# Patient Record
Sex: FEMALE | Race: WHITE | Employment: OTHER | URBAN - NONMETROPOLITAN AREA
[De-identification: names, ages, dates, MRNs, and addresses within clinical notes are randomized per-mention and may not be internally consistent; named-entity substitution may affect disease eponyms.]

---

## 2017-03-07 ENCOUNTER — COMMUNICATION - GICH (OUTPATIENT)
Dept: FAMILY MEDICINE | Facility: OTHER | Age: 82
End: 2017-03-07

## 2017-05-11 ENCOUNTER — COMMUNICATION - GICH (OUTPATIENT)
Dept: FAMILY MEDICINE | Facility: OTHER | Age: 82
End: 2017-05-11

## 2017-06-14 ENCOUNTER — COMMUNICATION - GICH (OUTPATIENT)
Dept: FAMILY MEDICINE | Facility: OTHER | Age: 82
End: 2017-06-14

## 2017-06-14 DIAGNOSIS — R52 PAIN: ICD-10-CM

## 2017-07-05 ENCOUNTER — COMMUNICATION - GICH (OUTPATIENT)
Dept: FAMILY MEDICINE | Facility: OTHER | Age: 82
End: 2017-07-05

## 2017-07-24 ENCOUNTER — HISTORY (OUTPATIENT)
Dept: EMERGENCY MEDICINE | Facility: OTHER | Age: 82
End: 2017-07-24

## 2017-08-22 ENCOUNTER — AMBULATORY - GICH (OUTPATIENT)
Dept: FAMILY MEDICINE | Facility: OTHER | Age: 82
End: 2017-08-22

## 2017-08-22 ENCOUNTER — COMMUNICATION - GICH (OUTPATIENT)
Dept: FAMILY MEDICINE | Facility: OTHER | Age: 82
End: 2017-08-22

## 2017-08-22 DIAGNOSIS — L22 DIAPER DERMATITIS: ICD-10-CM

## 2017-09-01 ENCOUNTER — COMMUNICATION - GICH (OUTPATIENT)
Dept: FAMILY MEDICINE | Facility: OTHER | Age: 82
End: 2017-09-01

## 2017-09-01 DIAGNOSIS — R52 PAIN: ICD-10-CM

## 2017-09-05 ENCOUNTER — AMBULATORY - GICH (OUTPATIENT)
Dept: FAMILY MEDICINE | Facility: OTHER | Age: 82
End: 2017-09-05

## 2017-09-05 DIAGNOSIS — F41.9 ANXIETY DISORDER: ICD-10-CM

## 2017-09-07 ENCOUNTER — COMMUNICATION - GICH (OUTPATIENT)
Dept: FAMILY MEDICINE | Facility: OTHER | Age: 82
End: 2017-09-07

## 2017-09-07 DIAGNOSIS — T14.8XXA OTHER INJURY OF UNSPECIFIED BODY REGION: ICD-10-CM

## 2017-09-08 ENCOUNTER — COMMUNICATION - GICH (OUTPATIENT)
Dept: FAMILY MEDICINE | Facility: OTHER | Age: 82
End: 2017-09-08

## 2017-09-08 DIAGNOSIS — T14.8XXA OTHER INJURY OF UNSPECIFIED BODY REGION: ICD-10-CM

## 2017-09-25 ENCOUNTER — COMMUNICATION - GICH (OUTPATIENT)
Dept: FAMILY MEDICINE | Facility: OTHER | Age: 82
End: 2017-09-25

## 2017-09-25 DIAGNOSIS — F41.9 ANXIETY DISORDER: ICD-10-CM

## 2017-11-27 ENCOUNTER — AMBULATORY - GICH (OUTPATIENT)
Dept: SCHEDULING | Facility: OTHER | Age: 82
End: 2017-11-27

## 2017-12-18 ENCOUNTER — COMMUNICATION - GICH (OUTPATIENT)
Dept: FAMILY MEDICINE | Facility: OTHER | Age: 82
End: 2017-12-18

## 2017-12-28 NOTE — TELEPHONE ENCOUNTER
Patient Information     Patient Name MRN Katt Pittman [id number] Female 1934      Telephone Encounter by Enriqueta Mustafa at 2017 10:32 AM     Author:  Enriqueta Mustafa Service:  (none) Author Type:  (none)     Filed:  2017 10:37 AM Encounter Date:  2017 Status:  Signed     :  Enriqueta Mustafa [facility] is looking for alternative prescription for neomycin as they do not carry it.  This was prescribed for abrasion left shoulder, 2 1/2 by 3 cm, warm to touch.  Enriqueta Mustafa LPN 2017 10:36 AM

## 2017-12-28 NOTE — TELEPHONE ENCOUNTER
Patient Information     Patient Name MRN Katt Pittman [id number] Female 1934      Telephone Encounter by Zack Mohan MD at 2017 10:42 AM     Author:  Zack Mohan MD Service:  (none) Author Type:  Physician     Filed:  2017 10:42 AM Encounter Date:  2017 Status:  Signed     :  Zack Mohan MD (Physician)            Sent in mupirocin instead

## 2017-12-28 NOTE — TELEPHONE ENCOUNTER
Patient Information     Patient Name MRN Katt Pittman [id number] Female 1934      Telephone Encounter by Jesus Martin RN at 2017 11:23 AM     Author:  Jesus Martin RN Service:  (none) Author Type:  NURS- Registered Nurse     Filed:  2017 11:32 AM Encounter Date:  2017 Status:  Signed     :  Jesus Martin RN (NURS- Registered Nurse)            Writer received faxed clarification request from [facility] with regards to patient's tylenol. Per rx request:    Reynaldo Gamboa states this should be 2T (650 mg) TID and a PRN dose. Please advise! Thanks [facility].    Chart review shows that a new rx for Tylenol was just entered in to patient's chart on 17 as per [facility] instructions-See 17 refill encounter.    Call placed to [facility] to discuss. Spoke to Mile, pharmacist. Mile reports that requested order on 17 faxed rx clarification request is discontinued in patient's chart on their end. They are using new order as of 17. Advised this writer to disregard rx request as noted above. Writer will do as requested. Nothing further needed from this writer at this time.    Unable to complete prescription refill per RN Medication Refill Policy.................... Jesus Martin RN ....................  2017   11:31 AM

## 2017-12-28 NOTE — TELEPHONE ENCOUNTER
Patient Information     Patient Name MRN Katt Pittman [id number] Female 1934      Telephone Encounter by Colin Garcia MD at 2017  2:09 PM     Author:  Colin Garcia MD  Service:  (none) Author Type:  Physician     Filed:  2017  2:10 PM  Encounter Date:  2017 Status:  Addendum     :  Colin Garcia MD (Physician)        Related Notes: Original Note by Colin Garcia MD (Physician) filed at 2017  2:10 PM            Popeye it up with diagnosis.  I have not seen her since 2016 so need to find out why she is using this.

## 2017-12-28 NOTE — TELEPHONE ENCOUNTER
Patient Information     Patient Name MRN Katt Pittman [id number] Female 1934      Telephone Encounter by Britney Vu at 2017 10:47 AM     Author:  Britney Vu Service:  (none) Author Type:  (none)     Filed:  2017 10:49 AM Encounter Date:  2017 Status:  Signed     :  Britney Vu            Staff reports that patient 's left shoulder has what looks like an abrasion 2 1/2 by 3 cm . They also say it is warm to the touch . She doesn't have a temp. They are requesting a antibiotic ointment to put on this . Please advise if this can be done .  Britney Vu LPN ....................2017  10:48 AM

## 2017-12-28 NOTE — TELEPHONE ENCOUNTER
Patient Information     Patient Name MRN Katt Pittman [id number] Female 1934      Telephone Encounter by Colin Garcia MD at 2017  8:13 AM     Author:  Colin Garcia MD Service:  (none) Author Type:  Physician     Filed:  2017  8:13 AM Encounter Date:  2017 Status:  Signed     :  Colin Garcia MD (Physician)            A&D sent in/

## 2017-12-28 NOTE — TELEPHONE ENCOUNTER
Patient Information     Patient Name MRN Katt Pittman [id number] Female 1934      Telephone Encounter by Nadia Cote at 2017  4:53 PM     Author:  Nadia Cote Service:  (none) Author Type:  (none)     Filed:  2017  5:03 PM Encounter Date:  2017 Status:  Signed     :  Nadia Cote            The home is using the A&D for this patient due to being in the memory care unit, and urine   Nadia Cote ....................  2017   4:56 PM

## 2017-12-28 NOTE — TELEPHONE ENCOUNTER
Patient Information     Patient Name MRN Sex Katt Albert [id number] Female 1934      Telephone Encounter by Danielito Norris LPN at 2017 10:52 AM     Author:  Danielito Norris LPN Service:  (none) Author Type:  NURS- Licensed Practical Nurse     Filed:  2017 10:55 AM Encounter Date:  2017 Status:  Signed     :  Danielito Norris LPN (NURS- Licensed Practical Nurse)            Contacted Abbie at [facility] and she needs an Rx for A&D and a written script for patient so  they can apply it to her as needed. Please send Rx to [facility]. Please place order.  Danielito Norris LPN ..............2017 10:54 AM

## 2017-12-28 NOTE — TELEPHONE ENCOUNTER
Patient Information     Patient Name MRN Katt Pittman [id number] Female 1934      Telephone Encounter by Jesus Martin RN at 2017  3:55 PM     Author:  Jesus Martin RN Service:  (none) Author Type:  NURS- Registered Nurse     Filed:  2017  4:02 PM Encounter Date:  2017 Status:  Signed     :  Jesus Martin RN (NURS- Registered Nurse)            This is a Refill request from: [facility]  Name of Medication: Tylenol  Quantity requested: 100 tabs with 3 refills  Last fill date: Never, as per chart review  Due for refill: Yes, as per rx request  Last visit with LEONID MOHAN was on: No past appointments listed with this provider  PCP:  No primary care provider on file.  Controlled Substance Agreement:  N/A   Diagnosis r/t this medication request: Pain, fever as per rx request    Writer received fax from [facility] pharmacy. Fax was originally from [facility], and was forwarded to [facility]. Is dated 17 from [facility]. Orders noted on fax as follows:    Tylenol 325 mg-650 mg PO every 6 hours PRN fever, pain. 24 hour daily max 4,000 mg tylenol.    Order is signed by Dr. Mohan. No active orders for tylenol in patient's chart at this time. Writer will fanny up rx as noted above and per fax. Will route to Dr. Mohan for his consideration/approval as writer is unable to fill rx as requested.     Unable to complete prescription refill per RN Medication Refill Policy.................... Jesus Martin RN ....................  2017   3:55 PM

## 2017-12-28 NOTE — TELEPHONE ENCOUNTER
Patient Information     Patient Name MRN Katt Pittman [id number] Female 1934      Telephone Encounter by Ghada Chakraborty MD at 2017 12:05 PM     Author:  Ghada Chakraborty MD Service:  (none) Author Type:  Physician     Filed:  2017 12:06 PM Encounter Date:  2017 Status:  Signed     :  Ghada Chakraborty MD (Physician)            Ok to treat with triple antibiotic oitment twice daily until wound healed.  If worsening, she should be seen.  Ghada Chakraborty MD ....................  2017   12:05 PM

## 2017-12-28 NOTE — TELEPHONE ENCOUNTER
Patient Information     Patient Name MRN Katt Pittman [id number] Female 1934      Telephone Encounter by Jesus Martin RN at 2017  8:18 AM     Author:  Jesus Martin RN Service:  (none) Author Type:  NURS- Registered Nurse     Filed:  2017  8:29 AM Encounter Date:  2017 Status:  Signed     :  Jesus Martin RN (NURS- Registered Nurse)            This is a Refill request from: [id number]  Name of Medication: Mapap  Quantity requested: 540 tabs with refills  Last fill date: 17 as per rx request  Due for refill: Yes, as per rx request  Last visit with LEONID MOHAN was on: No past appointments listed with this provider  PCP:  No primary care provider on file.  Controlled Substance Agreement: N/A   Diagnosis r/t this medication request: Unknown    Writer received faxed rx request from [facility] pharmacy with regards to patient's Mapap. Per rx request:    Reynaldo Gamboa would like directions to be 2 tabs 3 times daily and 1-2 tabs every 6 hours as needed (max of 1 or 2 PRN doses per day).     Current order in patient's chart for tylenol is noted to be historical, and is for tylenol 500 mg. Chart review shows that writer had gotten a clarification request about Mapap on 17 as well...     Writer will fanny up rx request at this time. Will route rx request to Dr. Mohan for his consideration/approval at this time.     Unable to complete prescription refill per RN Medication Refill Policy.................... Jesus Martin RN ....................  2017   8:18 AM

## 2017-12-28 NOTE — TELEPHONE ENCOUNTER
Patient Information     Patient Name MRN Katt Pittman [id number] Female 1934      Telephone Encounter by Enriqueta Mustafa at 2017 10:46 AM     Author:  Enriqueta Mustafa Service:  (none) Author Type:  (none)     Filed:  2017 10:46 AM Encounter Date:  2017 Status:  Signed     :  Enriqueta Mustafa [facility] notified.  Enriqueta Mustafa LPN 2017 10:46 AM

## 2017-12-28 NOTE — TELEPHONE ENCOUNTER
Patient Information     Patient Name MRN Katt Pittman [id number] Female 1934      Telephone Encounter by Ghada Chakraborty MD at 2017  1:30 PM     Author:  Ghada Chakraborty MD Service:  (none) Author Type:  Physician     Filed:  2017  1:31 PM Encounter Date:  2017 Status:  Signed     :  Ghada Chakraborty MD (Physician)            This is over the counter, but prescription signed.  Ghada Chakraborty MD ....................  2017   1:31 PM

## 2017-12-28 NOTE — TELEPHONE ENCOUNTER
Patient Information     Patient Name MRN Katt Pittman [id number] Female 1934      Telephone Encounter by Britney Vu at 2017  1:25 PM     Author:  Britney Vu Service:  (none) Author Type:  (none)     Filed:  2017  1:25 PM Encounter Date:  2017 Status:  Signed     :  Britney Vu            Please fill prescription .  Britney Vu LPN ....................2017  1:25 PM

## 2018-01-01 ENCOUNTER — RESULTS ONLY (OUTPATIENT)
Dept: LAB | Age: 83
End: 2018-01-01

## 2018-01-01 ENCOUNTER — TRANSFERRED RECORDS (OUTPATIENT)
Dept: HEALTH INFORMATION MANAGEMENT | Facility: OTHER | Age: 83
End: 2018-01-01

## 2018-01-01 ENCOUNTER — NURSING HOME VISIT (OUTPATIENT)
Dept: GERIATRICS | Facility: OTHER | Age: 83
End: 2018-01-01
Attending: NURSE PRACTITIONER
Payer: COMMERCIAL

## 2018-01-01 ENCOUNTER — HOSPITAL ENCOUNTER (EMERGENCY)
Facility: OTHER | Age: 83
Discharge: NURSING, REHAB OR LONG-TERM CARE | End: 2018-12-26
Attending: PHYSICIAN ASSISTANT | Admitting: PHYSICIAN ASSISTANT
Payer: COMMERCIAL

## 2018-01-01 ENCOUNTER — APPOINTMENT (OUTPATIENT)
Dept: GENERAL RADIOLOGY | Facility: OTHER | Age: 83
End: 2018-01-01
Attending: PHYSICIAN ASSISTANT
Payer: COMMERCIAL

## 2018-01-01 ENCOUNTER — MEDICAL CORRESPONDENCE (OUTPATIENT)
Dept: HEALTH INFORMATION MANAGEMENT | Facility: OTHER | Age: 83
End: 2018-01-01

## 2018-01-01 VITALS
SYSTOLIC BLOOD PRESSURE: 117 MMHG | TEMPERATURE: 97 F | RESPIRATION RATE: 16 BRPM | OXYGEN SATURATION: 98 % | HEART RATE: 70 BPM | DIASTOLIC BLOOD PRESSURE: 81 MMHG

## 2018-01-01 VITALS
SYSTOLIC BLOOD PRESSURE: 97 MMHG | HEART RATE: 69 BPM | DIASTOLIC BLOOD PRESSURE: 69 MMHG | OXYGEN SATURATION: 98 % | HEIGHT: 65 IN | TEMPERATURE: 98 F | BODY MASS INDEX: 20.56 KG/M2 | RESPIRATION RATE: 18 BRPM | WEIGHT: 123.4 LBS

## 2018-01-01 DIAGNOSIS — F02.81 ALZHEIMER'S DEMENTIA WITH BEHAVIORAL DISTURBANCE, UNSPECIFIED TIMING OF DEMENTIA ONSET: Primary | ICD-10-CM

## 2018-01-01 DIAGNOSIS — G30.9 ALZHEIMER'S DEMENTIA WITH BEHAVIORAL DISTURBANCE, UNSPECIFIED TIMING OF DEMENTIA ONSET: Primary | ICD-10-CM

## 2018-01-01 DIAGNOSIS — E03.9 HYPOTHYROIDISM, UNSPECIFIED TYPE: ICD-10-CM

## 2018-01-01 DIAGNOSIS — T17.308A CHOKING: ICD-10-CM

## 2018-01-01 DIAGNOSIS — K08.9 POOR DENTITION: ICD-10-CM

## 2018-01-01 DIAGNOSIS — E03.9 HYPOTHYROIDISM, UNSPECIFIED TYPE: Primary | ICD-10-CM

## 2018-01-01 LAB
ALBUMIN SERPL-MCNC: 3.8 G/DL (ref 3.5–5.7)
ALP SERPL-CCNC: 90 U/L (ref 34–104)
ALT SERPL W P-5'-P-CCNC: 9 U/L (ref 7–52)
ANION GAP SERPL CALCULATED.3IONS-SCNC: 8 MMOL/L (ref 3–14)
AST SERPL W P-5'-P-CCNC: 8 U/L (ref 13–39)
BASOPHILS # BLD AUTO: 0.1 10E9/L (ref 0–0.2)
BASOPHILS NFR BLD AUTO: 0.7 %
BILIRUB SERPL-MCNC: 0.5 MG/DL (ref 0.3–1)
BUN SERPL-MCNC: 24 MG/DL (ref 7–25)
CALCIUM SERPL-MCNC: 8.9 MG/DL (ref 8.6–10.3)
CHLORIDE SERPL-SCNC: 110 MMOL/L (ref 98–107)
CO2 SERPL-SCNC: 24 MMOL/L (ref 21–31)
CREAT SERPL-MCNC: 0.96 MG/DL (ref 0.6–1.2)
DIFFERENTIAL METHOD BLD: ABNORMAL
EOSINOPHIL # BLD AUTO: 0.3 10E9/L (ref 0–0.7)
EOSINOPHIL NFR BLD AUTO: 3.4 %
ERYTHROCYTE [DISTWIDTH] IN BLOOD BY AUTOMATED COUNT: 13.2 % (ref 10–15)
GFR SERPL CREATININE-BSD FRML MDRD: 75 ML/MIN/1.7M2
GLUCOSE SERPL-MCNC: 96 MG/DL (ref 70–105)
HCT VFR BLD AUTO: 33.6 % (ref 35–47)
HGB BLD-MCNC: 10.9 G/DL (ref 11.7–15.7)
IMM GRANULOCYTES # BLD: 0 10E9/L (ref 0–0.4)
IMM GRANULOCYTES NFR BLD: 0.4 %
LYMPHOCYTES # BLD AUTO: 1.8 10E9/L (ref 0.8–5.3)
LYMPHOCYTES NFR BLD AUTO: 24.2 %
MCH RBC QN AUTO: 31.6 PG (ref 26.5–33)
MCHC RBC AUTO-ENTMCNC: 32.4 G/DL (ref 31.5–36.5)
MCV RBC AUTO: 97 FL (ref 78–100)
MONOCYTES # BLD AUTO: 0.5 10E9/L (ref 0–1.3)
MONOCYTES NFR BLD AUTO: 7 %
NEUTROPHILS # BLD AUTO: 4.8 10E9/L (ref 1.6–8.3)
NEUTROPHILS NFR BLD AUTO: 64.3 %
PLATELET # BLD AUTO: 277 10E9/L (ref 150–450)
POTASSIUM SERPL-SCNC: 3.9 MMOL/L (ref 3.5–5.1)
PROT SERPL-MCNC: 6.7 G/DL (ref 6.4–8.9)
RBC # BLD AUTO: 3.45 10E12/L (ref 3.8–5.2)
SODIUM SERPL-SCNC: 142 MMOL/L (ref 134–144)
TSH SERPL DL<=0.05 MIU/L-ACNC: 0.54 IU/ML (ref 0.34–5.6)
WBC # BLD AUTO: 7.4 10E9/L (ref 4–11)

## 2018-01-01 PROCEDURE — 99283 EMERGENCY DEPT VISIT LOW MDM: CPT | Mod: 25 | Performed by: PHYSICIAN ASSISTANT

## 2018-01-01 PROCEDURE — 99283 EMERGENCY DEPT VISIT LOW MDM: CPT | Mod: Z6 | Performed by: PHYSICIAN ASSISTANT

## 2018-01-01 PROCEDURE — 99283 EMERGENCY DEPT VISIT LOW MDM: CPT | Performed by: PHYSICIAN ASSISTANT

## 2018-01-01 PROCEDURE — 84443 ASSAY THYROID STIM HORMONE: CPT

## 2018-01-01 PROCEDURE — 71045 X-RAY EXAM CHEST 1 VIEW: CPT

## 2018-01-01 RX ORDER — LEVOTHYROXINE SODIUM 25 UG/1
25 TABLET ORAL EVERY OTHER DAY
Qty: 30 TABLET | Refills: 5 | Status: SHIPPED | OUTPATIENT
Start: 2018-01-01 | End: 2019-01-01

## 2018-01-01 RX ORDER — LEVOTHYROXINE SODIUM 50 UG/1
50 TABLET ORAL EVERY OTHER DAY
Qty: 30 TABLET | Refills: 5 | Status: SHIPPED | OUTPATIENT
Start: 2018-01-01 | End: 2019-01-01

## 2018-01-01 ASSESSMENT — MIFFLIN-ST. JEOR: SCORE: 1010.62

## 2018-01-03 NOTE — TELEPHONE ENCOUNTER
Patient Information     Patient Name MRN Katt Pittman [id number] Female 1934      Telephone Encounter by Tiffanie Vasquez at 3/7/2017  4:46 PM     Author:  Tiffanie Vasquez Service:  (none) Author Type:  (none)     Filed:  3/7/2017  4:48 PM Encounter Date:  3/7/2017 Status:  Signed     :  Tiffanie Vasquez            Received request for PA for xarelto. Spoke with MD. States ok to stop xarelto at this time. [facility] notified but can't take verbal order. Need MD ok to stop.  Tiffanie Vasquez LPN   3/7/2017  4:48 PM

## 2018-01-03 NOTE — TELEPHONE ENCOUNTER
Patient Information     Patient Name MRKatt Ramirez [id number] Female 1934      Telephone Encounter by Tiffanie Vasquez at 3/8/2017  9:12 AM     Author:  Tiffanie Vasquez Service:  (none) Author Type:  (none)     Filed:  3/8/2017  9:12 AM Encounter Date:  3/7/2017 Status:  Signed     :  Tiffanie Vasquez            This note faxed to [facility] jaxson Vasquez LPN   3/8/2017  9:12 AM

## 2018-01-05 NOTE — TELEPHONE ENCOUNTER
Patient Information     Patient Name MRN Katt Pittman [id number] Female 1934      Telephone Encounter by Jesus Martin RN at 2017  1:33 PM     Author:  Jesus Martin RN Service:  (none) Author Type:  NURS- Registered Nurse     Filed:  2017  1:45 PM Encounter Date:  2017 Status:  Signed     :  Jesus Martin RN (NURS- Registered Nurse)            [facility] MAPAP 325 mg tab. Medication not listed on patient's med list at this time as either active, historical, or ever prescribed. Chart review shows that Dr. Garcia is not patient's PCP, and that PCP is Dr. Clements (see 10/31/16 office visit notes).    Call placed to [facility] pharmacy. Spoke to Niurka pharmacy tech. Advised her that refill should be directed to patient's PCP. franklyn Bah states that she will direct refill to PCP as requested. Writer will disregard refill request at this time.    Unable to complete prescription refill per RN Medication Refill Policy.................... Jesus Martin RN ....................  2017   1:43 PM

## 2018-01-19 ENCOUNTER — COMMUNICATION - GICH (OUTPATIENT)
Dept: FAMILY MEDICINE | Facility: OTHER | Age: 83
End: 2018-01-19

## 2018-01-19 DIAGNOSIS — F03.90 DEMENTIA WITHOUT BEHAVIORAL DISTURBANCE (H): ICD-10-CM

## 2018-02-12 NOTE — TELEPHONE ENCOUNTER
Patient Information     Patient Name MRN Katt Pittman [id number] Female 1934      Telephone Encounter by Jesus Martin RN at 2017 10:32 AM     Author:  Jesus Martin RN Service:  (none) Author Type:  NURS- Registered Nurse     Filed:  2017 10:48 AM Encounter Date:  2017 Status:  Signed     :  Jesus Martin RN (NURS- Registered Nurse)            Writer received a faxed rx request from [id number] for nystatin powder for patient. Chart review shows that Dr. Garcia has not seen patient since 10/31/16, and at that time patient was a patient's of Dr. Clements's. Chart review shows that patient is now residing at [facility]. Rx as requested her never been ordered for patient in [facility]. Call placed to [facility] to see where rx is to be applied. Spoke to CHAKA Schilling. CHAKA Schilling reports that they actually would like rx to be discontinued in patient's chart. Patient is no longer using rx, and they do not need refills. Dr. Garcia is out of the office until 17, but [facility] would need a faxed order to d/c. Since rx is PRN, CHAKA Schilling reports that faxed order can be sent over on 17 by Dr. Garcia. Writer will refuse rx request from pharmacy at this time as rx is no longer needed per LPN report. Writer also alerted LPN that patient should see a provider in the clinic to establish care as patient with no PCP at this time. LPN states understanding and will notify family.    Writer will refuse rx request at this time. Will route encounter to Dr. Garcia for him to write a D/c order for nystatin powder upon his return on 17.    Unable to complete prescription refill per RN Medication Refill Policy.................... Jesus Martin RN ....................  2017   10:45 AM

## 2018-02-12 NOTE — TELEPHONE ENCOUNTER
Patient Information     Patient Name MRN Katt Pittman [id number] Female 1934      Telephone Encounter by Jesus Martin RN at 2017  2:43 PM     Author:  Jseus Martin RN Service:  (none) Author Type:  NURS- Registered Nurse     Filed:  2017  2:44 PM Encounter Date:  2017 Status:  Signed     :  Jesus Martin RN (NURS- Registered Nurse)            Writer contacted [facility]. Spoke to CHAKA Schilling. Gave CHAKA Schilling verbal order to stop nystatin as per Dr. Garcia. CHAKA Schilling will process verbal order per Dr. Garcia and this writer. Nothing further needed at this time.    Jesus Martin RN ....................  2017   2:44 PM

## 2018-02-12 NOTE — TELEPHONE ENCOUNTER
Patient Information     Patient Name MRN Katt Pittman [id number] Female 1934      Telephone Encounter by Colin Garcia MD at 2017  2:33 PM     Author:  Colin Garcia MD Service:  (none) Author Type:  Physician     Filed:  2017  2:33 PM Encounter Date:  2017 Status:  Signed     :  Colin Garcia MD (Physician)            Ok to stop nystatin powder.

## 2018-02-22 ENCOUNTER — DOCUMENTATION ONLY (OUTPATIENT)
Dept: FAMILY MEDICINE | Facility: OTHER | Age: 83
End: 2018-02-22

## 2018-02-22 PROBLEM — E03.9 HYPOTHYROIDISM: Status: ACTIVE | Noted: 2018-02-22

## 2018-02-22 PROBLEM — K21.9 ESOPHAGEAL REFLUX: Status: ACTIVE | Noted: 2018-02-22

## 2018-02-22 PROBLEM — J30.9 ALLERGIC RHINITIS: Status: ACTIVE | Noted: 2018-02-22

## 2018-02-22 PROBLEM — K44.9 HIATAL HERNIA: Status: ACTIVE | Noted: 2018-02-22

## 2018-02-22 RX ORDER — LEVOTHYROXINE SODIUM 50 UG/1
50 TABLET ORAL
COMMUNITY
Start: 2015-02-01 | End: 2018-01-01

## 2018-02-22 RX ORDER — BUSPIRONE HYDROCHLORIDE 10 MG/1
10 TABLET ORAL 2 TIMES DAILY
COMMUNITY
Start: 2017-09-25 | End: 2019-01-01

## 2018-02-22 RX ORDER — LORAZEPAM 0.5 MG/1
0.5 TABLET ORAL EVERY 6 HOURS PRN
COMMUNITY
End: 2018-08-21

## 2018-02-22 RX ORDER — SENNOSIDES A AND B 8.6 MG/1
2 TABLET, FILM COATED ORAL EVERY OTHER DAY
COMMUNITY
Start: 2016-07-21 | End: 2018-04-13

## 2018-02-22 RX ORDER — ACETAMINOPHEN 325 MG/1
TABLET ORAL
COMMUNITY
Start: 2017-09-01 | End: 2018-08-23

## 2018-02-22 RX ORDER — ACETAMINOPHEN 500 MG
1000 TABLET ORAL EVERY 6 HOURS PRN
COMMUNITY
Start: 2017-07-24 | End: 2018-08-28 | Stop reason: ALTCHOICE

## 2018-04-13 DIAGNOSIS — K59.00 CONSTIPATION, UNSPECIFIED CONSTIPATION TYPE: Primary | ICD-10-CM

## 2018-04-13 RX ORDER — SENNOSIDES A AND B 8.6 MG/1
1 TABLET, FILM COATED ORAL 2 TIMES DAILY
Qty: 180 TABLET | Refills: 3 | Status: SHIPPED | OUTPATIENT
Start: 2018-04-13 | End: 2019-01-01 | Stop reason: DRUGHIGH

## 2018-04-13 NOTE — TELEPHONE ENCOUNTER
Rx request received from [id number] in relation to patient's senna. Per faxed rx request, patient is taking senna 8.6 mg one tab BID. Current rx on file in patient's chart, as well as per Care Everywhere as follows:    SENNA LAX 8.6 mg tablet   TAKE 2 TABS (17.2MG) BY MOUTH EVERY OTHER DAY     Last office visit with PCP was on 10/31/16. Writer will fanny up rx as requested by pharmacy and will route rx request to PCP for his consideration/approval at this time. No dx to associate in patient's chart as well.    Unable to complete prescription refill per RN Medication Refill Policy. Jesus Martin 4/13/2018 9:20 AM

## 2018-04-27 DIAGNOSIS — K59.00 CONSTIPATION, UNSPECIFIED CONSTIPATION TYPE: ICD-10-CM

## 2018-04-27 RX ORDER — SENNOSIDES A AND B 8.6 MG/1
1 TABLET, FILM COATED ORAL 2 TIMES DAILY
Qty: 180 TABLET | Refills: 3 | OUTPATIENT
Start: 2018-04-27

## 2018-04-27 NOTE — TELEPHONE ENCOUNTER
Redundant Refill Request for Senna refused;    Medication Detail      Disp Refills Start End DELLA   senna (RA SENNA) 8.6 MG tablet 180 tablet 3 4/13/2018  No   Sig: Take 1 tablet by mouth 2 times daily   Class: E-Prescribe   Route: Oral   Order: [id number]   E-Prescribing Status: Receipt confirmed by pharmacy (4/13/2018  9:37 AM CDT)   Printout Tracking   External Result Report   Pharmacy   [id number] - [place], [place] - [address]     Unable to complete prescription refill per RN Medication Refill Policy. Jesus Martin 4/27/2018 9:09 AM

## 2018-05-07 NOTE — TELEPHONE ENCOUNTER
Writer received an additional faxed rx request from [id number] in relation to patient's senna. Call placed to pharmacy. Spoke to Stella, pharmacy franklyn. Advised her of rx as sent in with 4/13/18 refill encounter. Asked tech if they had received that rx. Pharmacy tech states that they had indeed received it, and that they will refill rx as requested from that rx at this time. Writer will disregard rx request at this time as no refill is needed at this time.    Unable to complete prescription refill per RN Medication Refill Policy. Jesus Martin 5/7/2018 10:49 AM

## 2018-05-21 DIAGNOSIS — K59.00 CONSTIPATION, UNSPECIFIED CONSTIPATION TYPE: ICD-10-CM

## 2018-05-21 RX ORDER — SENNOSIDES A AND B 8.6 MG/1
1 TABLET, FILM COATED ORAL 2 TIMES DAILY
Qty: 180 TABLET | Refills: 3 | OUTPATIENT
Start: 2018-05-21

## 2018-05-21 NOTE — TELEPHONE ENCOUNTER
Writer again received [id number] relation to patient's senna. Per chart review, this Rx request was cared for with 4/13/18 refill request, as well as 4/27/18 refill request. Rx has been filled as noted below for an annual supply:    Medication Detail      Disp Refills Start End DELLA   senna (RA SENNA) 8.6 MG tablet 180 tablet 3 4/13/2018  No   Sig: Take 1 tablet by mouth 2 times daily   Class: E-Prescribe   Route: Oral   Order: [id number]   E-Prescribing Status: Receipt confirmed by pharmacy (4/13/2018  9:37 AM CDT)   Printout Tracking   External Result Report   Pharmacy   [id number] - [place], [place] - [address]       Writer will refuse Rx request at this time. Will make note of Rx as noted above in Rx refusal to pharmacy.    Unable to complete prescription refill per RN Medication Refill Policy. Jesus Martin 5/21/2018 8:32 AM

## 2018-08-14 ENCOUNTER — HOSPITAL ENCOUNTER (EMERGENCY)
Facility: OTHER | Age: 83
Discharge: HOME | End: 2018-08-14
Attending: PHYSICIAN ASSISTANT | Admitting: PHYSICIAN ASSISTANT
Payer: COMMERCIAL

## 2018-08-14 VITALS
SYSTOLIC BLOOD PRESSURE: 131 MMHG | TEMPERATURE: 97.1 F | DIASTOLIC BLOOD PRESSURE: 78 MMHG | WEIGHT: 125 LBS | BODY MASS INDEX: 21.46 KG/M2 | RESPIRATION RATE: 16 BRPM | OXYGEN SATURATION: 98 % | HEART RATE: 68 BPM
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
## 2018-08-14 DIAGNOSIS — S61.411A LACERATION OF RIGHT HAND WITHOUT FOREIGN BODY, INITIAL ENCOUNTER: ICD-10-CM

## 2018-08-14 DIAGNOSIS — S00.83XA CONTUSION, CHEEK, INITIAL ENCOUNTER: ICD-10-CM

## 2018-08-14 PROCEDURE — 27210282 ZZH ADHESIVE DERMABOND SKIN: Performed by: PHYSICIAN ASSISTANT

## 2018-08-14 PROCEDURE — 99283 EMERGENCY DEPT VISIT LOW MDM: CPT | Mod: Z6 | Performed by: PHYSICIAN ASSISTANT

## 2018-08-14 PROCEDURE — 99283 EMERGENCY DEPT VISIT LOW MDM: CPT | Mod: 25 | Performed by: PHYSICIAN ASSISTANT

## 2018-08-14 PROCEDURE — 12001 RPR S/N/AX/GEN/TRNK 2.5CM/<: CPT | Mod: Z6 | Performed by: PHYSICIAN ASSISTANT

## 2018-08-14 PROCEDURE — 12001 RPR S/N/AX/GEN/TRNK 2.5CM/<: CPT | Performed by: PHYSICIAN ASSISTANT

## 2018-08-14 ASSESSMENT — ENCOUNTER SYMPTOMS
ABDOMINAL PAIN: 0
DIFFICULTY URINATING: 0
COLOR CHANGE: 0
NECK STIFFNESS: 0
ARTHRALGIAS: 0
EYE REDNESS: 0
HEADACHES: 0
CONFUSION: 0
FEVER: 0
SHORTNESS OF BREATH: 0

## 2018-08-14 NOTE — ED AVS SNAPSHOT
[facility]    [address]    [place] [address]    Phone:  [phone]    Fax:  [phone]                                       Katt Aldrich   MRN: [id number]    Department:  [facility]   Date of Visit:  8/14/2018           Patient Information     Date Of Birth          8/13/1934        Your diagnoses for this visit were:     Fall, initial encounter     Contusion, cheek, initial encounter     Laceration of right hand without foreign body, initial encounter        You were seen by Mahesh Moser PA-C.      24 Hour Appointment Hotline     To schedule an appointment at [facility], please call [phone]. If you don't have a family doctor or clinic, we will help you find one. [facility] clinics are conveniently located to serve the needs of you and your family.           Review of your medicines      Our records show that you are taking the medicines listed below. If these are incorrect, please call your family doctor or clinic.        Dose / Directions Last dose taken    * acetaminophen 500 MG tablet   Commonly known as:  TYLENOL   Dose:  1000 mg        Take 1,000 mg by mouth every 6 hours as needed Max acetaminophen dose: 4000mg in 24 hrs.   Refills:  0        * acetaminophen 325 MG tablet   Commonly known as:  TYLENOL        Take 2 tabs PO TID and 1-2 tabs PO every 6 hours PRN (Max of 1 or 2 PRN doses per day) Max acetaminophen dose: 4000mg in 24 hrs.   Refills:  0        BENZETHONIUM CL & PETROLATUM EX   Dose:  1 Tube        Apply 1 Tube topically daily as needed   Refills:  0        busPIRone 10 MG tablet   Commonly known as:  BUSPAR   Dose:  10 mg        Take 10 mg by mouth 2 times daily   Refills:  0        Lanolin-Petrolatum 15.5-53.4 % Oint        Apply topically daily as needed   Refills:  0        levothyroxine 50 MCG tablet   Commonly known as:  SYNTHROID/LEVOTHROID   Dose:  50 mcg        Take 50 mcg by mouth every morning (before  "breakfast)   Refills:  0        LORazepam 0.5 MG tablet   Commonly known as:  ATIVAN   Dose:  0.5 mg        Take 0.5 mg by mouth every 6 hours as needed   Refills:  0        senna 8.6 MG tablet   Commonly known as:  RA SENNA   Dose:  1 tablet   Quantity:  180 tablet        Take 1 tablet by mouth 2 times daily   Refills:  3        * Notice:  This list has 2 medication(s) that are the same as other medications prescribed for you. Read the directions carefully, and ask your doctor or other care provider to review them with you.            Orders Needing Specimen Collection     None      Pending Results     No orders found from 8/12/2018 to 8/15/2018.            Pending Culture Results     No orders found from 8/12/2018 to 8/15/2018.            Pending Results Instructions     If you had any lab results that were not finalized at the time of your Discharge, you can call the ED Lab Result RN at [phone]. You will be contacted by this team for any positive Lab results or changes in treatment. The nurses are available 7 days a week from 10A to 6:30P.  You can leave a message 24 hours per day and they will return your call.        Thank you for choosing [facility]       Thank you for choosing [facility] for your care. Our goal is always to provide you with excellent care. Hearing back from our patients is one way we can continue to improve our services. Please take a few minutes to complete the written survey that you may receive in the mail after you visit with us. Thank you!        [employer] Information     [employer] lets you send messages to your doctor, view your test results, renew your prescriptions, schedule appointments and more. To sign up, go to [email or web address] . Click on \"Log in\" on the left side of the screen, which will take you to the Welcome page. Then click on \"Sign up Now\" on the right side of the page.     You will be asked to enter the access code listed below, as well as some personal information. " Please follow the directions to create your username and password.     Your access code is: [email or web address]  Expires: 2018  5:54 PM     Your access code will  in 90 days. If you need help or a new code, please call your [facility] clinic or [phone].        Care EveryWhere ID     This is your Care EveryWhere ID. This could be used by other organizations to access your [facility] medical records  [id number]        Equal Access to Services     [facility] : Hadii gaby valentine hadasho Soomaali, waaxda luqadaha, qaybta kaalmada adeegyacydney, anali cartwright . So [facility] [phone].    ATENCIÓN: Si habla español, tiene a soto disposición servicios gratuitos de asistencia lingüística. Llame al [phone].    We comply with applicable federal civil rights laws and [place] laws. We do not discriminate on the basis of race, color, national origin, age, disability, sex, sexual orientation, or gender identity.            After Visit Summary       This is your record. Keep this with you and show to your community pharmacist(s) and doctor(s) at your next visit.

## 2018-08-14 NOTE — ED TRIAGE NOTES
Presents via meds 1 ambulance having had a fall a [facility]. She hit her right hand and face. Patient has bruising around her right eye, cheek and chin. Did break a small piece of her tooth. Also has a 2 1/2 cm skin tear on her right hand.

## 2018-08-14 NOTE — ED PROVIDER NOTES
History     Chief Complaint   Patient presents with     Fall     Facial Injury     HPI Comments: This is a 84-year-old female who resides at [facility].  Has severe dementia.  Apparently tripped and hit her right hand as well as her right zygomatic area.  This was witnessed there was no loss of consciousness.  She did break a small piece of her right incisor tooth as well.  She sustained a small 2.5 cm skin tear to the dorsal aspect of her right hand over the ring and small finger knuckles.  She is constantly fidgeting with her wounds.  When examining her zygomatic area she is constantly pulling my hands away as well.  No other injuries at this time.    The history is provided by the patient and the EMS personnel.         Problem List:    Patient Active Problem List    Diagnosis Date Noted     Allergic rhinitis 02/22/2018     Priority: Medium     Myalgia and myositis 02/22/2018     Priority: Medium     Esophageal reflux 02/22/2018     Priority: Medium     Hiatal hernia 02/22/2018     Priority: Medium     Hypothyroidism 02/22/2018     Priority: Medium     BRBPR (bright red blood per rectum) 11/12/2016     Priority: Medium     Deep vein thrombosis (DVT) of right lower extremity (H) 11/12/2016     Priority: Medium     Dementia 11/12/2016     Priority: Medium     Headache 08/13/2013     Priority: Medium     Pulmonary fibrosis (H) 02/20/2012     Priority: Medium     Disorder of bone and cartilage 04/05/2006     Priority: Medium        Past Medical History:    Past Medical History:   Diagnosis Date     Diverticulosis of intestine without perforation or abscess without bleeding      Pulmonary fibrosis (H)      Pure hypercholesterolemia        Past Surgical History:    Past Surgical History:   Procedure Laterality Date     COLON SURGERY      01/2005,secondary to diverticulosis (sigmoid)     ESOPHAGOSCOPY, GASTROSCOPY, DUODENOSCOPY (EGD), COMBINED      06/19/2009     EXTRACAPSULAR CATARACT EXTRATION  WITH INTRAOCULAR LENS IMPLANT      2009,right     HEMORRHOID SURGERY      surgery x 3     OTHER SURGICAL HISTORY      2005,[id number],BLEPHAROPLASTY,Eye lid surgery bilateral     TONSILLECTOMY, ADENOIDECTOMY, COMBINED      No Comments Provided       Family History:    Family History   Problem Relation Age of Onset     Arthritis Mother      Arthritis, hx of rheumatoid arthritis     Substance Abuse Father      Alcohol/Drug, complications of alcohol     HEART DISEASE Brother      Heart Disease,Hx of CAD     Diabetes Brother      Diabetes,Diabetes mellitus       Social History:  Marital Status:   [2]  Social History   Substance Use Topics     Smoking status: Former Smoker     Packs/day: 0.50     Years: 26.00     Types: Cigarettes     Smokeless tobacco: Never Used     Alcohol use No        Medications:      acetaminophen (TYLENOL) 325 MG tablet   acetaminophen (TYLENOL) 500 MG tablet   BENZETHONIUM CL & PETROLATUM EX   busPIRone (BUSPAR) 10 MG tablet   Lanolin-Petrolatum 15.5-53.4 % OINT   levothyroxine (SYNTHROID/LEVOTHROID) 50 MCG tablet   LORazepam (ATIVAN) 0.5 MG tablet   senna (RA SENNA) 8.6 MG tablet         Review of Systems   Unable to perform ROS: Dementia   Constitutional: Negative for fever.   HENT: Negative for congestion.         Minimal right zygomatic swelling.  She does have a small incisor tooth chip   Eyes: Negative for redness.   Respiratory: Negative for shortness of breath.    Cardiovascular: Negative for chest pain.   Gastrointestinal: Negative for abdominal pain.   Genitourinary: Negative for difficulty urinating.   Musculoskeletal: Negative for arthralgias and neck stiffness.        Right hand 2.5 cm superficial skin tear.   Skin: Negative for color change.   Neurological: Negative for headaches.   Psychiatric/Behavioral: Negative for confusion.       Physical Exam   BP: 131/78  Pulse: 68  Heart Rate: 68  Temp: 97.1  F (36.2  C)  Resp: 16  Weight: 56.7 kg (125 lb)  SpO2: 98 %      Physical  Exam   Constitutional: No distress.   HENT:   Head: Atraumatic.   Right Ear: No drainage or tenderness.   Left Ear: No drainage or tenderness.   Nose: No mucosal edema or rhinorrhea. No epistaxis.   Mouth/Throat: Oropharynx is clear and moist. No oropharyngeal exudate.       Right zygomatic cheek contusion and bruising.  Bones appear intact on palpation.  His pack was applied but patient continues to remove this.  Does have a small piece of her right incisor tooth chipped off otherwise her teeth are intact.   Eyes: Pupils are equal, round, and reactive to light. No scleral icterus. Right eye exhibits normal extraocular motion and no nystagmus. Left eye exhibits normal extraocular motion and no nystagmus. Right pupil is round and reactive. Left pupil is round and reactive. Pupils are equal.   Cardiovascular: Normal heart sounds and intact distal pulses.    Pulmonary/Chest: Breath sounds normal. No respiratory distress.   Abdominal: Soft. Bowel sounds are normal. There is no tenderness.   Musculoskeletal: He exhibits no edema or tenderness.        Hands:  2.5 cm superficial laceration to her right hand over the ring and long finger knuckles.  No bleeding at this time.  Patient is constantly picking at this   Neurological: Coordination normal. GCS eye subscore is 4. GCS verbal subscore is 4. GCS motor subscore is 6.   No focal neurological findings or deficits.   Skin: Skin is warm. No rash noted. He is not diaphoretic.       ED Course     ED Course     Laceration repair  Date/Time: 8/14/2018 5:15 PM  Performed by: KRISTIN JUNE  Authorized by: KRISTIN JUNE   Consent: Verbal consent obtained. Written consent not obtained.  Consent given by: guardian  Patient understanding: patient states understanding of the procedure being performed  Patient consent: the patient's understanding of the procedure matches consent given  Procedure consent: procedure consent matches procedure scheduled  Patient identity confirmed:  arm band, hospital-assigned identification number and anonymous protocol, patient vented/unresponsive  Body area: upper extremity  Location details: right hand  Laceration length: 2.5 cm  Contamination: The wound is contaminated.  Tendon involvement: none  Nerve involvement: none  Vascular damage: no    Sedation:  Patient sedated: no  Preparation: Patient was prepped and draped in the usual sterile fashion.  Irrigation solution: saline  Skin closure: glue  Technique: simple  Approximation: close  Approximation difficulty: simple  Dressing: tube gauze  Patient tolerance: Patient tolerated the procedure well with no immediate complications            No results found for this or any previous visit (from the past 24 hour(s)).    Medications - No data to display    Assessments & Plan (with Medical Decision Making)     I have reviewed the nursing notes.    I have reviewed the findings, diagnosis, plan and need for follow up with the patient.      New Prescriptions    No medications on file       Final diagnoses:   Fall, initial encounter   Contusion, cheek, initial encounter   Laceration of right hand without foreign body, initial encounter     Afebrile.  Vital signs stable.  Severely demented elderly female with a trip and fall sustaining a right zygomatic contusion to her cheek as well as a superficial laceration to her right hand over the ring and small knuckles.  Laceration was repaired as above.  She is not currently on blood thinners.  He was observed for an extended period time in the ER with no decline noted.  She will return to the memory care unit at this time.  Follow-up for further eval evaluation as needed there are any concerns.  8/14/2018   [facility]     Mahesh Moser PA-C  08/14/18 2090

## 2018-08-14 NOTE — ED AVS SNAPSHOT
[facility]    [address]    [place] [address]    Phone:  [phone]    Fax:  [phone]                                       Katt Aldrich   MRN: [id number]    Department:  [facility]   Date of Visit:  8/14/2018           After Visit Summary Signature Page     I have received my discharge instructions, and my questions have been answered. I have discussed any challenges I see with this plan with the nurse or doctor.    ..........................................................................................................................................  Patient/Patient Representative Signature      ..........................................................................................................................................  Patient Representative Print Name and Relationship to Patient    ..................................................               ................................................  Date                                            Time    ..........................................................................................................................................  Reviewed by Signature/Title    ...................................................              ..............................................  Date                                                            Time

## 2018-08-21 DIAGNOSIS — K59.00 CONSTIPATION, UNSPECIFIED CONSTIPATION TYPE: ICD-10-CM

## 2018-08-21 DIAGNOSIS — F02.81 ALZHEIMER'S DEMENTIA WITH BEHAVIORAL DISTURBANCE, UNSPECIFIED TIMING OF DEMENTIA ONSET: Primary | ICD-10-CM

## 2018-08-21 DIAGNOSIS — G30.9 ALZHEIMER'S DEMENTIA WITH BEHAVIORAL DISTURBANCE, UNSPECIFIED TIMING OF DEMENTIA ONSET: Primary | ICD-10-CM

## 2018-08-23 DIAGNOSIS — R52 PAIN: Primary | ICD-10-CM

## 2018-08-23 RX ORDER — LORAZEPAM 0.5 MG/1
0.5 TABLET ORAL EVERY 6 HOURS PRN
Qty: 60 TABLET | Refills: 3 | Status: SHIPPED | OUTPATIENT
Start: 2018-08-23

## 2018-08-23 RX ORDER — SENNOSIDES A AND B 8.6 MG/1
1 TABLET, FILM COATED ORAL 2 TIMES DAILY PRN
COMMUNITY
Start: 2016-07-21 | End: 2019-01-01

## 2018-08-23 RX ORDER — PETROLATUM,WHITE/LANOLIN
OINTMENT (GRAM) TOPICAL
COMMUNITY
Start: 2017-08-24 | End: 2019-01-01

## 2018-08-23 RX ORDER — SENNOSIDES A AND B 8.6 MG/1
1 TABLET, FILM COATED ORAL 2 TIMES DAILY
Qty: 180 TABLET | Refills: 3 | OUTPATIENT
Start: 2018-08-23

## 2018-08-23 NOTE — TELEPHONE ENCOUNTER
PLEASE REVIEW, SIGN AND SEND AS APPROPRIATE: THANK YOU.    [id number] sent Rx request for the following:    LORazepam (ATIVAN) 0.5 MG tablet  Last Office Visit: 10/31/16  Future Office visit:   None.    Routing refill request to provider for review/approval because:    Patient last seen Oct. 2016- Called and spoke with Nicki at [facility] and she states Pt is severely demented and lives in their memory care unit, with agitations and it would very difficult to bring her in for an annual Px.    Drug not on the [facility], [facility] or [facility] refill protocol or controlled substance    Patient reported/hisotrical    Unable to complete prescription refill per RN Medication Refill Policy. Yael Henderson RN .............. 8/23/2018  2:37 PM

## 2018-08-23 NOTE — TELEPHONE ENCOUNTER
Redundant refill request refused: Too soon.    senna (RA SENNA) 8.6 MG tablet 180 tablet 3 4/13/2018  No   Sig - Route: Take 1 tablet by mouth 2 times daily - Oral   Class: E-Prescribe   Order: [id number]   E-Prescribing Status: Receipt confirmed by pharmacy (4/13/2018  9:37 AM CDT)     [id number] - [place], [place] - [address]     Unable to complete prescription refill per RN Medication Refill Policy. Yael Henderson RN .............. 8/23/2018  2:09 PM

## 2018-08-27 RX ORDER — ACETAMINOPHEN 325 MG/1
325-650 TABLET ORAL EVERY 6 HOURS PRN
Qty: 540 TABLET | Refills: 11 | Status: SHIPPED | OUTPATIENT
Start: 2018-08-27 | End: 2018-08-28 | Stop reason: ALTCHOICE

## 2018-08-27 NOTE — TELEPHONE ENCOUNTER
MAPAP 325MG TAB      Last Written Prescription Date:  09/01/2017 by Dr. Mckeon  Last Fill Quantity: 540,   # refills: 11  Last Office Visit: 10/31/16  Future Office visit:  None scheduled     Routing refill request to provider for review/approval because:  Patient has not been seen by PCP since 10/31/16. Spoke to EC,  German, and he states patient is at [facility] on a memory care unit and he is in charge of making her appointments. German states once he figures out a good day for the patient to come in he will call and schedule an appointment. Please fill medication if appropriate. Thank you.     Ada Dickinson, RN on 8/27/2018 at 11:13 AM

## 2018-08-28 DIAGNOSIS — R52 PAIN: Primary | ICD-10-CM

## 2018-08-28 RX ORDER — ACETAMINOPHEN 325 MG/1
650 TABLET ORAL 3 TIMES DAILY
Qty: 540 TABLET | Refills: 3 | Status: SHIPPED | OUTPATIENT
Start: 2018-08-28 | End: 2019-01-01

## 2018-10-03 ENCOUNTER — HOSPITAL ENCOUNTER (EMERGENCY)
Facility: OTHER | Age: 83
Discharge: NURSING, REHAB OR LONG-TERM CARE | End: 2018-10-03
Attending: FAMILY MEDICINE | Admitting: FAMILY MEDICINE
Payer: COMMERCIAL

## 2018-10-03 ENCOUNTER — APPOINTMENT (OUTPATIENT)
Dept: CT IMAGING | Facility: OTHER | Age: 83
End: 2018-10-03
Attending: FAMILY MEDICINE
Payer: COMMERCIAL

## 2018-10-03 ENCOUNTER — APPOINTMENT (OUTPATIENT)
Dept: GENERAL RADIOLOGY | Facility: OTHER | Age: 83
End: 2018-10-03
Attending: FAMILY MEDICINE
Payer: COMMERCIAL

## 2018-10-03 VITALS
TEMPERATURE: 98 F | RESPIRATION RATE: 16 BRPM | HEART RATE: 56 BPM | WEIGHT: 135 LBS | SYSTOLIC BLOOD PRESSURE: 102 MMHG | HEIGHT: 65 IN | OXYGEN SATURATION: 100 % | BODY MASS INDEX: 22.49 KG/M2 | DIASTOLIC BLOOD PRESSURE: 87 MMHG

## 2018-10-03 DIAGNOSIS — R41.0 DISORIENTATION: Primary | ICD-10-CM

## 2018-10-03 LAB
ALBUMIN UR-MCNC: NEGATIVE MG/DL
APPEARANCE UR: ABNORMAL
BACTERIA #/AREA URNS HPF: ABNORMAL /HPF
BILIRUB UR QL STRIP: NEGATIVE
COLOR UR AUTO: YELLOW
GLUCOSE UR STRIP-MCNC: NEGATIVE MG/DL
HGB UR QL STRIP: NEGATIVE
KETONES UR STRIP-MCNC: NEGATIVE MG/DL
LEUKOCYTE ESTERASE UR QL STRIP: ABNORMAL
NITRATE UR QL: POSITIVE
NON-SQ EPI CELLS #/AREA URNS LPF: ABNORMAL /LPF
PH UR STRIP: 5 PH (ref 5–9)
RBC #/AREA URNS AUTO: ABNORMAL /HPF
SOURCE: ABNORMAL
SP GR UR STRIP: >1.03 (ref 1–1.03)
UROBILINOGEN UR STRIP-ACNC: 0.2 EU/DL (ref 0.2–1)
WBC #/AREA URNS AUTO: ABNORMAL /HPF

## 2018-10-03 PROCEDURE — 70450 CT HEAD/BRAIN W/O DYE: CPT

## 2018-10-03 PROCEDURE — 99284 EMERGENCY DEPT VISIT MOD MDM: CPT | Mod: Z6 | Performed by: FAMILY MEDICINE

## 2018-10-03 PROCEDURE — 80053 COMPREHEN METABOLIC PANEL: CPT | Performed by: FAMILY MEDICINE

## 2018-10-03 PROCEDURE — 81001 URINALYSIS AUTO W/SCOPE: CPT | Performed by: FAMILY MEDICINE

## 2018-10-03 PROCEDURE — 99285 EMERGENCY DEPT VISIT HI MDM: CPT | Mod: 25 | Performed by: FAMILY MEDICINE

## 2018-10-03 PROCEDURE — 71045 X-RAY EXAM CHEST 1 VIEW: CPT

## 2018-10-03 PROCEDURE — 36415 COLL VENOUS BLD VENIPUNCTURE: CPT | Performed by: FAMILY MEDICINE

## 2018-10-03 PROCEDURE — 85025 COMPLETE CBC W/AUTO DIFF WBC: CPT | Performed by: FAMILY MEDICINE

## 2018-10-03 RX ORDER — SULFAMETHOXAZOLE/TRIMETHOPRIM 800-160 MG
1 TABLET ORAL 2 TIMES DAILY
Qty: 20 TABLET | Refills: 0 | Status: SHIPPED | OUTPATIENT
Start: 2018-10-03 | End: 2019-01-01

## 2018-10-03 NOTE — ED PROVIDER NOTES
History     Chief Complaint   Patient presents with     not acting normal     HPI  Katt Aldrich is a 84 year old male who is the emergency department with alteration of consciousness from baseline.  Happened similarly in the past with dehydration and UTI.  She is accompanied currently with her spouse who states that now she is back to her baseline.  The nursing home however was concerned.  No fevers there.  Problem List:    Patient Active Problem List    Diagnosis Date Noted     Allergic rhinitis 02/22/2018     Priority: Medium     Myalgia and myositis 02/22/2018     Priority: Medium     Esophageal reflux 02/22/2018     Priority: Medium     Hiatal hernia 02/22/2018     Priority: Medium     Hypothyroidism 02/22/2018     Priority: Medium     BRBPR (bright red blood per rectum) 11/12/2016     Priority: Medium     Deep vein thrombosis (DVT) of right lower extremity (H) 11/12/2016     Priority: Medium     Dementia 11/12/2016     Priority: Medium     Headache 08/13/2013     Priority: Medium     Pulmonary fibrosis (H) 02/20/2012     Priority: Medium     Disorder of bone and cartilage 04/05/2006     Priority: Medium        Past Medical History:    Past Medical History:   Diagnosis Date     Diverticulosis of intestine without perforation or abscess without bleeding      Pulmonary fibrosis (H)      Pure hypercholesterolemia        Past Surgical History:    Past Surgical History:   Procedure Laterality Date     COLON SURGERY      01/2005,secondary to diverticulosis (sigmoid)     ESOPHAGOSCOPY, GASTROSCOPY, DUODENOSCOPY (EGD), COMBINED      06/19/2009     EXTRACAPSULAR CATARACT EXTRATION WITH INTRAOCULAR LENS IMPLANT      2009,right     HEMORRHOID SURGERY      surgery x 3     OTHER SURGICAL HISTORY      2005,[id number],BLEPHAROPLASTY,Eye lid surgery bilateral     TONSILLECTOMY, ADENOIDECTOMY, COMBINED      No Comments Provided       Family History:    Family History   Problem Relation Age of Onset     Arthritis Mother       "Arthritis, hx of rheumatoid arthritis     Substance Abuse Father      Alcohol/Drug, complications of alcohol     HEART DISEASE Brother      Heart Disease,Hx of CAD     Diabetes Brother      Diabetes,Diabetes mellitus       Social History:  Marital Status:   [5]  Social History   Substance Use Topics     Smoking status: Former Smoker     Packs/day: 0.50     Years: 26.00     Types: Cigarettes     Smokeless tobacco: Never Used     Alcohol use No        Medications:      busPIRone (BUSPAR) 10 MG tablet   levothyroxine (SYNTHROID/LEVOTHROID) 50 MCG tablet   sulfamethoxazole-trimethoprim (BACTRIM DS) 800-160 MG per tablet   acetaminophen (TYLENOL) 325 MG tablet   BENZETHONIUM CL & PETROLATUM EX   Lanolin-Petrolatum 15.5-53.4 % OINT   LORazepam (ATIVAN) 0.5 MG tablet   senna (RA SENNA) 8.6 MG tablet   senna (SENNA-LAX) 8.6 MG tablet   Vitamins A & D (VITAMIN A & D) ointment         Review of Systems  No fevers chills or shakes no abdominal pain no complaints of pain.  A complete review of systems was unable to be obtained due to her dementia.  A limited review however was possible  Physical Exam   BP: 93/64  Pulse: 56  Temp: 98  F (36.7  C)  Resp: 16  Height: 165.1 cm (5' 5\")  Weight: 61.2 kg (135 lb)  SpO2: 100 %      Physical Exam   Constitutional: No distress.   Cardiovascular: Normal rate.    No murmur heard.  Pulmonary/Chest: Effort normal and breath sounds normal. No respiratory distress. He has no wheezes. He has no rales.   Abdominal: Soft.   Musculoskeletal: He exhibits no edema.   Neurological: He is alert.   Nursing note and vitals reviewed.      ED Course     ED Course     Procedures               Results for orders placed or performed during the hospital encounter of 10/03/18 (from the past 24 hour(s))   CBC with platelets differential   Result Value Ref Range    WBC 7.4 4.0 - 11.0 10e9/L    RBC Count 3.45 (L) 4.4 - 5.9 10e12/L    Hemoglobin 10.9 (L) 13.3 - 17.7 g/dL    Hematocrit 33.6 (L) 40.0 - 53.0 % "    MCV 97 78 - 100 fl    MCH 31.6 26.5 - 33.0 pg    MCHC 32.4 31.5 - 36.5 g/dL    RDW 13.2 10.0 - 15.0 %    Platelet Count 277 150 - 450 10e9/L    Diff Method Automated Method     % Neutrophils 64.3 %    % Lymphocytes 24.2 %    % Monocytes 7.0 %    % Eosinophils 3.4 %    % Basophils 0.7 %    % Immature Granulocytes 0.4 %    Absolute Neutrophil 4.8 1.6 - 8.3 10e9/L    Absolute Lymphocytes 1.8 0.8 - 5.3 10e9/L    Absolute Monocytes 0.5 0.0 - 1.3 10e9/L    Absolute Eosinophils 0.3 0.0 - 0.7 10e9/L    Absolute Basophils 0.1 0.0 - 0.2 10e9/L    Abs Immature Granulocytes 0.0 0 - 0.4 10e9/L   Comprehensive metabolic panel   Result Value Ref Range    Sodium 142 134 - 144 mmol/L    Potassium 3.9 3.5 - 5.1 mmol/L    Chloride 110 (H) 98 - 107 mmol/L    Carbon Dioxide 24 21 - 31 mmol/L    Anion Gap 8 3 - 14 mmol/L    Glucose 96 70 - 105 mg/dL    Urea Nitrogen 24 7 - 25 mg/dL    Creatinine 0.96 0.70 - 1.30 mg/dL    GFR Estimate 75 >60 mL/min/1.7m2    GFR Estimate If Black >90 >60 mL/min/1.7m2    Calcium 8.9 8.6 - 10.3 mg/dL    Bilirubin Total 0.5 0.3 - 1.0 mg/dL    Albumin 3.8 3.5 - 5.7 g/dL    Protein Total 6.7 6.4 - 8.9 g/dL    Alkaline Phosphatase 90 34 - 104 U/L    ALT 9 7 - 52 U/L    AST 8 (L) 13 - 39 U/L   *UA reflex to Microscopic   Result Value Ref Range    Color Urine Yellow     Appearance Urine Cloudy     Glucose Urine Negative NEG^Negative mg/dL    Bilirubin Urine Negative NEG^Negative    Ketones Urine Negative NEG^Negative mg/dL    Specific Gravity Urine >1.030 (H) 1.000 - 1.030    Blood Urine Negative NEG^Negative    pH Urine 5.0 5.0 - 9.0 pH    Protein Albumin Urine Negative NEG^Negative mg/dL    Urobilinogen Urine 0.2 0.2 - 1.0 EU/dL    Nitrite Urine Positive (A) NEG^Negative    Leukocyte Esterase Urine Trace (A) NEG^Negative    Source Midstream Urine    Urine Microscopic   Result Value Ref Range    WBC Urine 0 - 5 OTO5^0 - 5 /HPF    RBC Urine O - 2 OTO2^O - 2 /HPF    Squamous Epithelial /LPF Urine Many (A)  FEW^Few /LPF    Bacteria Urine Many (A) NEG^Negative /HPF   CT Head w/o Contrast    Narrative    PROCEDURE: CT HEAD W/O CONTRAST 10/3/2018 10:48 AM    HISTORY: aloc;     COMPARISONS: None.    Meds/Dose Given: None.    TECHNIQUE: Axial noncontrast enhanced images were obtained with  coronal and sagittal reformatted images.    FINDINGS: There is moderately severe atrophy with some low attenuation  changes in the white matter consistent with mild small vessel ischemic  change. No mass or midline shift is seen. There is no extra-axial  fluid collection or focal hemorrhage.    Bone windows show no fracture. Visualized paranasal sinuses and  mastoid air cells are clear. There is some vascular calcification in  the carotid siphons.         Impression    IMPRESSION: Atrophy without acute mass effect or hemorrhage.    KEDAR BALDERAS MD   XR Chest Port 1 View    Narrative    PROCEDURE:  XR CHEST PORT 1 VW    HISTORY:  aloc; .     COMPARISON:  10/31/2016    FINDINGS:   The cardiac silhouette is normal in size. The pulmonary vasculature is  normal.  There is a small hiatal hernia. Mild interstitial thickening  at the lung bases is unchanged. No pleural effusion or pneumothorax.      Impression    IMPRESSION:  Stable chest x-ray.      HEU RAMIREZ MD       Medications - No data to display    Assessments & Plan (with Medical Decision Making)       New Prescriptions    SULFAMETHOXAZOLE-TRIMETHOPRIM (BACTRIM DS) 800-160 MG PER TABLET    Take 1 tablet by mouth 2 times daily for 10 days       Final diagnoses:   Disorientation     Patient back to baseline now.  We will treat empirically for UTI.  Given nitrite positive urine and many bacteria, urinalysis is equivocal however given the many squamous epithelium as well.  Stable chest x-ray head CT is negative.  Now that she is back to baseline anyhow no further workup is indicated at this time.  Return to ER if worsening.  Discharge back to [facility].    10/3/2018   GRAND ITASCA  [facility]     Tip Azevedo MD  10/03/18 4037

## 2018-10-03 NOTE — ED NOTES
Pt's  left to go to an appointment.  He states he will be back about 1300, and will take her home if she is discharged.

## 2018-10-03 NOTE — ED AVS SNAPSHOT
[facility]    [address]    [place] [address]    Phone:  [phone]    Fax:  [phone]                                       Katt Aldrich   MRN: [id number]    Department:  [facility]   Date of Visit:  10/3/2018           After Visit Summary Signature Page     I have received my discharge instructions, and my questions have been answered. I have discussed any challenges I see with this plan with the nurse or doctor.    ..........................................................................................................................................  Patient/Patient Representative Signature      ..........................................................................................................................................  Patient Representative Print Name and Relationship to Patient    ..................................................               ................................................  Date                                   Time    ..........................................................................................................................................  Reviewed by Signature/Title    ...................................................              ..............................................  Date                                               Time          22EPIC Rev 08/18

## 2018-10-03 NOTE — ED TRIAGE NOTES
Patient here via EMS [facility] from [facility].  Patient has not been acting like herself lately.   states she has not been getting out of bed like normal yesterday.  Patient did have a fall a month ago and chipped her tooth and they do not know if that is infected and maybe that is why or possible dehydration.    Suicide screening not done due to dementia.

## 2018-10-03 NOTE — DISCHARGE INSTRUCTIONS
Altered Level of Consciousness  Level of consciousness (LOC) is a measure of a person s ability to interact with other people and to react to the surroundings. A person with an altered level of consciousness may not respond to touch or voices. He or she may look vacant or blank and may not make eye contact with others. He or she may be limp and may not move for a long time or show little interest in moving. He or she may also be confused.  There are many causes of altered LOC. They include low blood sugar, infection, medicines, injuries, or other medical problems.  Altered LOC is a medical emergency. The healthcare provider will do tests to help find the cause. These may include blood tests and imaging tests. The person is treated so breathing and heart rate are stable. An intravenous (IV) line may be put into a vein in the arm or hand to give medicines. Once the cause of altered LOC is found, the goal is to treat the cause.  In almost all cases, the person will be admitted to the hospital for observation.  Home care  When your loved one is released from the hospital, you will be given guidelines for caring for him or her. In general:    Follow the healthcare provider's instructions for giving any prescribed medicines to your child.    Stay with your loved one or have another responsible adult look after him or her. Watch carefully for any return of symptoms or changes in behavior.    If the person has diabetes, make sure that any approved medicines are given on time and as prescribed.  Follow-up care  Follow up with the healthcare provider or our staff.  When to seek medical advice  Call the healthcare provider right away for any of the following:    Symptoms of altered LOC return    New symptoms appear  Date Last Reviewed: 8/23/2015 2000-2017 [employer]. [address] [place] [address], [place] [address]. All rights reserved. This information is not intended as a substitute for professional medical  care. Always follow your healthcare professional's instructions.

## 2018-10-03 NOTE — ED AVS SNAPSHOT
[facility]    [address]    [place] [address]    Phone:  [phone]    Fax:  [phone]                                       Katt Aldrich   MRN: [id number]    Department:  [facility]   Date of Visit:  10/3/2018           Patient Information     Date Of Birth          8/13/1934        Your diagnoses for this visit were:     Disorientation        You were seen by Tip Azevedo MD.      Follow-up Information     Follow up with Colin Garcia MD.    Specialty:  Family Practice    Why:  As needed    Contact information:    [address]  [place] [id number] [phone]          Follow up with [facility].    Specialty:  EMERGENCY MEDICINE    Why:  If symptoms worsen    Contact information:    [address]  [place] [id number] [phone]        Discharge Instructions         Altered Level of Consciousness  Level of consciousness (LOC) is a measure of a person s ability to interact with other people and to react to the surroundings. A person with an altered level of consciousness may not respond to touch or voices. He or she may look vacant or blank and may not make eye contact with others. He or she may be limp and may not move for a long time or show little interest in moving. He or she may also be confused.  There are many causes of altered LOC. They include low blood sugar, infection, medicines, injuries, or other medical problems.  Altered LOC is a medical emergency. The healthcare provider will do tests to help find the cause. These may include blood tests and imaging tests. The person is treated so breathing and heart rate are stable. An intravenous (IV) line may be put into a vein in the arm or hand to give medicines. Once the cause of altered LOC is found, the goal is to treat the cause.  In almost all cases, the person will be admitted to the hospital for observation.  Home care  When  your loved one is released from the hospital, you will be given guidelines for caring for him or her. In general:    Follow the healthcare provider's instructions for giving any prescribed medicines to your child.    Stay with your loved one or have another responsible adult look after him or her. Watch carefully for any return of symptoms or changes in behavior.    If the person has diabetes, make sure that any approved medicines are given on time and as prescribed.  Follow-up care  Follow up with the healthcare provider or our staff.  When to seek medical advice  Call the healthcare provider right away for any of the following:    Symptoms of altered LOC return    New symptoms appear  Date Last Reviewed: 8/23/2015 2000-2017 [employer]. [address] [place] [address], [place] [address]. All rights reserved. This information is not intended as a substitute for professional medical care. Always follow your healthcare professional's instructions.          24 Hour Appointment Hotline     To schedule an appointment at [facility], please call [phone]. If you don't have a family doctor or clinic, we will help you find one. [facility] clinics are conveniently located to serve the needs of you and your family.           Review of your medicines      START taking        Dose / Directions Last dose taken    sulfamethoxazole-trimethoprim 800-160 MG per tablet   Commonly known as:  BACTRIM DS   Dose:  1 tablet   Quantity:  20 tablet        Take 1 tablet by mouth 2 times daily for 10 days   Refills:  0          Our records show that you are taking the medicines listed below. If these are incorrect, please call your family doctor or clinic.        Dose / Directions Last dose taken    acetaminophen 325 MG tablet   Commonly known as:  TYLENOL   Dose:  650 mg   Quantity:  540 tablet        Take 2 tablets (650 mg) by mouth 3 times daily   Refills:  3        BENZETHONIUM CL & PETROLATUM EX   Dose:  1 Tube        Apply 1  Tube topically daily as needed   Refills:  0        busPIRone 10 MG tablet   Commonly known as:  BUSPAR   Dose:  10 mg        Take 10 mg by mouth 2 times daily   Refills:  0        Lanolin-Petrolatum 15.5-53.4 % Oint        Apply topically daily as needed   Refills:  0        levothyroxine 50 MCG tablet   Commonly known as:  SYNTHROID/LEVOTHROID   Dose:  50 mcg        Take 50 mcg by mouth every morning (before breakfast)   Refills:  0        LORazepam 0.5 MG tablet   Commonly known as:  ATIVAN   Dose:  0.5 mg   Quantity:  60 tablet        Take 1 tablet (0.5 mg) by mouth every 6 hours as needed   Refills:  3        * SENNA-LAX 8.6 MG tablet   Generic drug:  senna        TAKE 2 TABS (17.2MG) BY MOUTH EVERY OTHER DAY   Refills:  0        * senna 8.6 MG tablet   Commonly known as:  RA SENNA   Dose:  1 tablet   Quantity:  180 tablet        Take 1 tablet by mouth 2 times daily   Refills:  3        vitamin A & D ointment        Refills:  0        * Notice:  This list has 2 medication(s) that are the same as other medications prescribed for you. Read the directions carefully, and ask your doctor or other care provider to review them with you.            Prescriptions were sent or printed at these locations (1 Prescription)                   [id number] - [place], [place] - [address]   [address] [facility] [address]    Telephone:  [phone]   Fax:  [phone]   Hours:                  E-Prescribed (1 of 1)         sulfamethoxazole-trimethoprim (BACTRIM DS) 800-160 MG per tablet                Procedures and tests performed during your visit     *UA reflex to Microscopic    CBC with platelets differential    CT Head w/o Contrast    Comprehensive metabolic panel    Urine Microscopic    XR Chest Port 1 View      Orders Needing Specimen Collection     None      Pending Results     No orders found from 10/1/2018 to 10/4/2018.            Pending Culture Results     No orders found  "from 10/1/2018 to 10/4/2018.            Pending Results Instructions     If you had any lab results that were not finalized at the time of your Discharge, you can call the ED Lab Result RN at [phone]. You will be contacted by this team for any positive Lab results or changes in treatment. The nurses are available 7 days a week from 10A to 6:30P.  You can leave a message 24 hours per day and they will return your call.        Thank you for choosing [facility]       Thank you for choosing [facility] for your care. Our goal is always to provide you with excellent care. Hearing back from our patients is one way we can continue to improve our services. Please take a few minutes to complete the written survey that you may receive in the mail after you visit with us. Thank you!        [employer] Information     [employer] lets you send messages to your doctor, view your test results, renew your prescriptions, schedule appointments and more. To sign up, go to [email or web address] . Click on \"Log in\" on the left side of the screen, which will take you to the Welcome page. Then click on \"Sign up Now\" on the right side of the page.     You will be asked to enter the access code listed below, as well as some personal information. Please follow the directions to create your username and password.     Your access code is: [email or web address]  Expires: 2018  5:54 PM     Your access code will  in 90 days. If you need help or a new code, please call your [facility] clinic or [phone].        Care EveryWhere ID     This is your Care EveryWhere ID. This could be used by other organizations to access your [facility] medical records  [id number]        Equal Access to Services     LELAND BEDOLLA : [place], adenike hurtado, anali carrion. So [facility] [phone].    ATENCIÓN: Si habla español, tiene a soto disposición servicios gratuitos de asistencia lingüística. " Jarrod garnett [phone].    We comply with applicable federal civil rights laws and [place] laws. We do not discriminate on the basis of race, color, national origin, age, disability, sex, sexual orientation, or gender identity.            After Visit Summary       This is your record. Keep this with you and show to your community pharmacist(s) and doctor(s) at your next visit.

## 2018-11-27 PROBLEM — K08.9 POOR DENTITION: Status: ACTIVE | Noted: 2018-01-01

## 2018-11-27 NOTE — MR AVS SNAPSHOT
After Visit Summary   11/27/2018    Katt Aldrich    MRN: [id number]           Patient Information     Date Of Birth          8/13/1934        Visit Information        Provider Department      11/27/2018 4:30 AM Marjan Blackwell NP [facility] [place] [facility]        Today's Diagnoses     Alzheimer's dementia with behavioral disturbance, unspecified timing of dementia onset    -  1    Hypothyroidism, unspecified type        Poor dentition           Follow-ups after your visit        Who to contact     If you have questions or need follow up information about today's clinic visit or your schedule please contact [facility] directly at [phone].  Normal or non-critical lab and imaging results will be communicated to you by MyChart, letter or phone within 4 business days after the clinic has received the results. If you do not hear from us within 7 days, please contact the clinic through MyChart or phone. If you have a critical or abnormal lab result, we will notify you by phone as soon as possible.  Submit refill requests through [employer] or call your pharmacy and they will forward the refill request to us. Please allow 3 business days for your refill to be completed.          Additional Information About Your Visit        Care EveryWhere ID     This is your Care EveryWhere ID. This could be used by other organizations to access your [facility] medical records  [id number]        Your Vitals Were     Pulse Temperature Respirations Pulse Oximetry          70 97  F (36.1  C) 16 98%         Blood Pressure from Last 3 Encounters:   11/27/18 117/81   10/03/18 102/87   08/14/18 131/78    Weight from Last 3 Encounters:   10/03/18 135 lb (61.2 kg)   08/14/18 125 lb (56.7 kg)   07/29/16 143 lb (64.9 kg)              Today, you had the following     No orders found for display         Today's Medication Changes          These changes are accurate as of 11/27/18  3:45 PM.  If you have any  questions, ask your nurse or doctor.               These medicines have changed or have updated prescriptions.        Dose/Directions    * levothyroxine 50 MCG tablet   Commonly known as:  SYNTHROID/LEVOTHROID   This may have changed:    - when to take this  - additional instructions   Used for:  Hypothyroidism, unspecified type   Changed by:  Marjan Blackwell NP        Dose:  50 mcg   Take 1 tablet (50 mcg) by mouth every other day Alternative every other day with 25 mcg tab.   Quantity:  30 tablet   Refills:  5       * levothyroxine 25 MCG tablet   Commonly known as:  SYNTHROID/LEVOTHROID   This may have changed:  You were already taking a medication with the same name, and this prescription was added. Make sure you understand how and when to take each.   Used for:  Hypothyroidism, unspecified type   Changed by:  Marjan Blackwell NP        Dose:  25 mcg   Take 1 tablet (25 mcg) by mouth every other day Alternate every other day with 50 mcg.   Quantity:  30 tablet   Refills:  5       * Notice:  This list has 2 medication(s) that are the same as other medications prescribed for you. Read the directions carefully, and ask your doctor or other care provider to review them with you.         Where to get your medicines      These medications were sent to [id number] - [place], [place] - [address]  [address] [facility] [address]     Phone:  [phone]     levothyroxine 25 MCG tablet    levothyroxine 50 MCG tablet                Primary Care Provider Office Phone # Fax #    Marjan Blackwell [phone]       [address]  [facility] [address]        Equal Access to Services     [facility] GRAEME AH: Hadii gaby waller Soricardo, waaxda luqadaha, qaybta kaalmada adeegyada, anali darby. So [facility] [phone].    ATENCIÓN: Si habla español, tiene a soto disposición servicios gratuitos de asistencia lingüística. Llame al [phone].    We  comply with applicable federal civil rights laws and [place] laws. We do not discriminate on the basis of race, color, national origin, age, disability, sex, sexual orientation, or gender identity.            Thank you!     Thank you for choosing [facility]  for your care. Our goal is always to provide you with excellent care. Hearing back from our patients is one way we can continue to improve our services. Please take a few minutes to complete the written survey that you may receive in the mail after your visit with us. Thank you!             Your Updated Medication List - Protect others around you: Learn how to safely use, store and throw away your medicines at www.disposemymeds.org.          This list is accurate as of 11/27/18  3:45 PM.  Always use your most recent med list.                   Brand Name Dispense Instructions for use Diagnosis    acetaminophen 325 MG tablet    TYLENOL    540 tablet    Take 2 tablets (650 mg) by mouth 3 times daily    Pain       BENZETHONIUM CL & PETROLATUM EX      Apply 1 Tube topically daily as needed        busPIRone 10 MG tablet    BUSPAR     Take 10 mg by mouth 2 times daily        Lanolin-Petrolatum 15.5-53.4 % Oint      Apply topically daily as needed        * levothyroxine 50 MCG tablet    SYNTHROID/LEVOTHROID    30 tablet    Take 1 tablet (50 mcg) by mouth every other day Alternative every other day with 25 mcg tab.    Hypothyroidism, unspecified type       * levothyroxine 25 MCG tablet    SYNTHROID/LEVOTHROID    30 tablet    Take 1 tablet (25 mcg) by mouth every other day Alternate every other day with 50 mcg.    Hypothyroidism, unspecified type       LORazepam 0.5 MG tablet    ATIVAN    60 tablet    Take 1 tablet (0.5 mg) by mouth every 6 hours as needed    Alzheimer's dementia with behavioral disturbance, unspecified timing of dementia onset       * SENNA-LAX 8.6 MG tablet   Generic drug:  senna      TAKE 2 TABS (17.2MG) BY MOUTH EVERY OTHER  DAY        * senna 8.6 MG tablet    RA SENNA    180 tablet    Take 1 tablet by mouth 2 times daily    Constipation, unspecified constipation type       vitamin A & D external ointment    BABY          * Notice:  This list has 4 medication(s) that are the same as other medications prescribed for you. Read the directions carefully, and ask your doctor or other care provider to review them with you.

## 2018-11-27 NOTE — PROGRESS NOTES
[facility] - ELDER CARE    Katt Aldrich  : 1934  MRN: [id number]  Primary Care Physician: Marjan Blackwell  [facility] - memory unit.     2018    HPI: Katt Aldrich is a 84 year old female being seen today at [facility] to establish care with this provider and for staff report of fever yesterday. Katt has advanced dementia and resides in the memory unit of [facility]. She is normally quite active and spends time in the main sitting area. Yesterday staff noted Katt had a fever of 100.8 and was more lethargic, not wanting to get out of bed. Staff has been monitoring Katt's vitals every 4 hours since yesterday afternoon and she has been afebrile since yesterday at 16:00.   She is afebrile today and is sitting in the day area. Staff note she is back to her usual self.   Her , Kashif, is here for today's visit.     Katt does have poor dentition and most of her bottom teeth are broken near the gum line. Per , Kashif, Katt did see Dr. Harris in early November and was told that she would need oral surgery to have her remaining bottom teeth extracted however he was concerned for tooth infection and treated her on a 14 day course of antibiotic. Kashif is unclear if he is to get a hold of Dr. Harris now that the antibiotic treatment is complete.     With her dementia, Katt is mostly non sensical. She does deny pain. She does not cooperate when asked to open her mouth.   She has no complaints at this time.     Medication reconciliation: completed.     CODE STATUS: DNR/DNI    Patient Active Problem List    Diagnosis Date Noted     Poor dentition 2018     Priority: Medium     Allergic rhinitis 2018     Priority: Medium     Myalgia and myositis 2018     Priority: Medium     Esophageal reflux 2018     Priority: Medium     Hiatal hernia 2018     Priority: Medium     Hypothyroidism 2018     Priority: Medium     Deep vein  thrombosis (DVT) of right lower extremity (H) 11/12/2016     Priority: Medium     Dementia 11/12/2016     Priority: Medium     Headache 08/13/2013     Priority: Medium     Pulmonary fibrosis (H) 02/20/2012     Priority: Medium     Disorder of bone and cartilage 04/05/2006     Priority: Medium     Past Medical History:   Diagnosis Date     Diverticulosis of intestine without perforation or abscess without bleeding     No Comments Provided     Pulmonary fibrosis (H)     No Comments Provided     Pure hypercholesterolemia     No Comments Provided     Social History     Social History     Marital status:      Spouse name: N/A     Number of children: N/A     Years of education: N/A     Occupational History     Not on file.     Social History Main Topics     Smoking status: Former Smoker     Packs/day: 0.50     Years: 26.00     Types: Cigarettes     Smokeless tobacco: Never Used     Alcohol use No     Drug use: Not on file      Comment: Drug use: No     Sexual activity: Not on file     Other Topics Concern     Not on file     Social History Narrative    She is .  Daughter age 51, twin sons age 48.  Has 4 step-sons. Now living at [facility] in [facility].     Family History   Problem Relation Age of Onset     Arthritis Mother      Arthritis, hx of rheumatoid arthritis     Substance Abuse Father      Alcohol/Drug, complications of alcohol     HEART DISEASE Brother      Heart Disease,Hx of CAD     Diabetes Brother      Diabetes,Diabetes mellitus     Current Outpatient Prescriptions   Medication Sig Dispense Refill     acetaminophen (TYLENOL) 325 MG tablet Take 2 tablets (650 mg) by mouth 3 times daily 540 tablet 3     BENZETHONIUM CL & PETROLATUM EX Apply 1 Tube topically daily as needed       busPIRone (BUSPAR) 10 MG tablet Take 10 mg by mouth 2 times daily       Lanolin-Petrolatum 15.5-53.4 % OINT Apply topically daily as needed       levothyroxine (SYNTHROID/LEVOTHROID) 50 MCG tablet Take 50 mcg by mouth  every morning (before breakfast)       LORazepam (ATIVAN) 0.5 MG tablet Take 1 tablet (0.5 mg) by mouth every 6 hours as needed 60 tablet 3     senna (RA SENNA) 8.6 MG tablet Take 1 tablet by mouth 2 times daily 180 tablet 3     senna (SENNA-LAX) 8.6 MG tablet TAKE 2 TABS (17.2MG) BY MOUTH EVERY OTHER DAY       Vitamins A & D (VITAMIN A & D) ointment        Allergies   Allergen Reactions     Aspirin Buffered GI Disturbance     Codeine Unknown       Review of systems:  Constitutional: positive for fever yesterday; no fever since yesterday at 16:00. No fever at present. Sleeping well. Denies pain.  Function: moderate assist, ambulatory  Nutrition: fair appetite, adequate water intake  Skin: no rashes or sores  Head/Eyes/Ears/Nose/Throat: no report of headache, no new vision problems, no nasal discharge or sore throat.  Teeth: h/o tooth infection  Cardiovascular: no chest pains or palpitations, no edema  Respiratory: no cough or shortness of breath  Endocrine: no polyuria, polydipsia, skin or hair changes, heat or cold intolerance  Gastrointestinal: no dysphagia, abdominal pain, nausea, vomiting, or change in bowel habits  Genitourinary: no change in urination, no frequency, urgency, dysuria, or blood in urine  Neurologic: positive for dementia  Psychiatric: no reports of anxiety, depression, or panic      PHYSICAL EXAM:  Vitals per Nursing staff: Blood pressure 117/81, pulse 70, temperature 97  F (36.1  C), resp. rate 16, SpO2 98 %.  GENERAL APPEARANCE: Well developed elderly, white female in no acute distress. Alert, confused.  SKIN: Warm and dry. There is no diaphoresis, cyanosis, rashes, or lesions.   HEENT: Head normocephalic, atraumatic. Pupils equal and reactive. Eyes without redness, drainage, or discharge. Nose without drainage, lips and mouth moist. Neck is supple.  TEETH: Most of her bottom teeth are absent above the gum line. Unable to fully visualize as she does not open her mouth for exam.   LUNGS: Normal  respirations, lung fields are clear to auscultation, no wheezes or rales.   HEART: Regular rhythm and rate; S1 and S2, no murmur, gallop or rub  ABDOMEN: Bowel sounds normal; Abdomen soft, no tenderness or guarding  NEUROLOGIC: She is confused but cooperative. Alert. MATTHEWS.   EXTREMITIES: No bilateral lower extremity edema. Warm to touch.  PSYCHIATRIC: Smiles, speech is non sensical.     Labs:  Component      Latest Ref Rng & Units 10/31/2016 11/13/2016   Red Blood Count - Historical      4.00 - 5.20 mil/cu mm  3.33 (L)   Immature Granulocytes (Metas,Myelos,Pros) - Historical      %     Hemoglobin      11.7 - 15.7 g/dL  10.0 (L)   Platelet Count      150 - 450 10e9/L  312   Hematocrit      35.0 - 47.0 %  31.1 (L)   MCHC      31.5 - 36.5 g/dL  32.2   % Neutrophils      %     % Monocytes      %     Absolute Basophils - Historical      <0.3 thou/cu mm     RDW      10.0 - 15.0 %  13.6   % Eosinophils      %     Absolute Neutrophils - Historical      1.7 - 7.0 thou/cu mm     Absolute Lymphocytes - Historical      0.9 - 2.9 thou/cu mm     Absolute Monocytes - Historical      <0.9 thou/cu mm     BASO      <3.0 %     Absolute Eosinophils - Historical      <0.5 thou/cu mm     Absolute Immature Granulocytes (Metas,Myelos,Pros) - Historical      <=0.3 thou/cu mm     White Blood Count - Historical      4.5 - 11.0 thou/cu mm  8.3   MCH      26.5 - 33.0 pg  30.1   MCV      78 - 100 fl  93   MPV      6.5 - 11.0 fL  5.8 (L)   % Lymphocytes      %     WBC      4.0 - 11.0 10e9/L     RBC Count      3.8 - 5.2 10e12/L     Diff Method           % Basophils      %     % Immature Granulocytes      %     Absolute Neutrophil      1.6 - 8.3 10e9/L     Absolute Lymphocytes      0.8 - 5.3 10e9/L     Absolute Monocytes      0.0 - 1.3 10e9/L     Absolute Eosinophils      0.0 - 0.7 10e9/L     Absolute Basophils      0.0 - 0.2 10e9/L     Abs Immature Granulocytes      0 - 0.4 10e9/L     GFR If Not  - Historical      >60 ml/min/1.73m2      Urea Nitrogen      7 - 25 mg/dL     GFR Estimate If Black      >60 mL/min/1.7m2     A/G Ratio - Historical      1.0 - 2.0     Creatinine      0.60 - 1.20 mg/dL     Sodium      134 - 144 mmol/L     Carbon Dioxide      21 - 31 mmol/L     Glucose      70 - 105 mg/dL     Albumin      3.5 - 5.7 g/dL     Alkaline Phosphatase      34 - 104 U/L     AST (SGOT) - Historical      13 - 39 IU/L     Anion Gap - Historical      5 - 18     BUN/Creatinine Ratio - Historical           Protein Total      6.4 - 8.9 g/dL     Bilirubin Total      0.3 - 1.0 mg/dL     ALT (SGPT) - Historical      7 - 52 IU/L     Calcium      8.6 - 10.3 mg/dL     Globulin - Historical      2.0 - 3.7 g/dL     Potassium      3.5 - 5.1 mmol/L     Chloride      98 - 107 mmol/L     Anion Gap      3 - 14 mmol/L     GFR Estimate      >60 mL/min/1.7m2     ALT      7 - 52 U/L     AST      13 - 39 U/L     TSH - Historical      0.34 - 5.60 uIU/mL 0.33 (L)      Component      Latest Ref Rng & Units 7/24/2017 10/3/2018   Red Blood Count - Historical      4.00 - 5.20 mil/cu mm 3.57 (L)    Immature Granulocytes (Metas,Myelos,Pros) - Historical      % 0.5    Hemoglobin      11.7 - 15.7 g/dL 11.4 (L) 10.9 (L)   Platelet Count      150 - 450 10e9/L 255 277   Hematocrit      35.0 - 47.0 % 34.1 33.6 (L)   MCHC      31.5 - 36.5 g/dL 33.4 32.4   % Neutrophils      % 73.9 (H) 64.3   % Monocytes      % 7.3 7.0   Absolute Basophils - Historical      <0.3 thou/cu mm 0.1    RDW      10.0 - 15.0 % 13.2 13.2   % Eosinophils      % 3.0 3.4   Absolute Neutrophils - Historical      1.7 - 7.0 thou/cu mm 6.3    Absolute Lymphocytes - Historical      0.9 - 2.9 thou/cu mm 1.2    Absolute Monocytes - Historical      <0.9 thou/cu mm 0.6    BASO      <3.0 % 0.7    Absolute Eosinophils - Historical      <0.5 thou/cu mm 0.3    Absolute Immature Granulocytes (Metas,Myelos,Pros) - Historical      <=0.3 thou/cu mm 0.0    White Blood Count - Historical      4.5 - 11.0 thou/cu mm 8.5    MCH       26.5 - 33.0 pg 31.9 31.6   MCV      78 - 100 fl 96 97   MPV      6.5 - 11.0 fL 9.4    % Lymphocytes      % 14.6 (L) 24.2   WBC      4.0 - 11.0 10e9/L  7.4   RBC Count      3.8 - 5.2 10e12/L  3.45 (L)   Diff Method        Automated Method   % Basophils      %  0.7   % Immature Granulocytes      %  0.4   Absolute Neutrophil      1.6 - 8.3 10e9/L  4.8   Absolute Lymphocytes      0.8 - 5.3 10e9/L  1.8   Absolute Monocytes      0.0 - 1.3 10e9/L  0.5   Absolute Eosinophils      0.0 - 0.7 10e9/L  0.3   Absolute Basophils      0.0 - 0.2 10e9/L  0.1   Abs Immature Granulocytes      0 - 0.4 10e9/L  0.0   GFR If Not  - Historical      >60 ml/min/1.73m2 32 (L)    Urea Nitrogen      7 - 25 mg/dL 31 (H) 24   GFR Estimate If Black      >60 mL/min/1.7m2 39 (L) >90   A/G Ratio - Historical      1.0 - 2.0 1.4    Creatinine      0.60 - 1.20 mg/dL 1.53 (H) 0.96   Sodium      134 - 144 mmol/L 137 142   Carbon Dioxide      21 - 31 mmol/L 22 24   Glucose      70 - 105 mg/dL 88 96   Albumin      3.5 - 5.7 g/dL 3.9 3.8   Alkaline Phosphatase      34 - 104 U/L 68 90   AST (SGOT) - Historical      13 - 39 IU/L 7 (L)    Anion Gap - Historical      5 - 18 7    BUN/Creatinine Ratio - Historical       20    Protein Total      6.4 - 8.9 g/dL 6.7 6.7   Bilirubin Total      0.3 - 1.0 mg/dL 0.3 0.5   ALT (SGPT) - Historical      7 - 52 IU/L 7    Calcium      8.6 - 10.3 mg/dL 9.4 8.9   Globulin - Historical      2.0 - 3.7 g/dL 2.8    Potassium      3.5 - 5.1 mmol/L 4.6 3.9   Chloride      98 - 107 mmol/L 108 (H) 110 (H)   Anion Gap      3 - 14 mmol/L  8   GFR Estimate      >60 mL/min/1.7m2  75   ALT      7 - 52 U/L  9   AST      13 - 39 U/L  8 (L)   TSH - Historical      0.34 - 5.60 uIU/mL         ASSESSMENT / PLAN:  (G30.9,  F02.81) Alzheimer's dementia with behavioral disturbance, unspecified timing of dementia onset  (primary encounter diagnosis)  Comment: advanced. Resides in memory care.   Plan: continue supportive  yohan.    (E03.9) Hypothyroidism, unspecified type  Comment: on replacement. It has been some time since her last TSH. Today's tsh 0.54.  Plan: given that her tsh is low normal, will reduce replacement hormone. Will alternate 50 mcg every other day with 25 mcg. Recheck tsh in 6 weeks.     (K08.9) Poor dentition  Comment: she follows with Dr. Harris who recently treated her with a course of antibiotic for tooth infection.   Plan: Katt's  will follow up with Dr. Harris today regarding possible oral surgery.    Follow up in 3 months, sooner if needed.     Marjan Blackwell, CNP

## 2018-12-26 NOTE — ED AVS SNAPSHOT
[facility]  [address]  [place] [address]  Phone:  [phone]  Fax:  [phone]                                    Katt Aldrich   MRN: [id number]    Department:  [facility]   Date of Visit:  12/26/2018           After Visit Summary Signature Page    I have received my discharge instructions, and my questions have been answered. I have discussed any challenges I see with this plan with the nurse or doctor.    ..........................................................................................................................................  Patient/Patient Representative Signature      ..........................................................................................................................................  Patient Representative Print Name and Relationship to Patient    ..................................................               ................................................  Date                                   Time    ..........................................................................................................................................  Reviewed by Signature/Title    ...................................................              ..............................................  Date                                               Time          22EPIC Rev 08/18

## 2018-12-26 NOTE — ED TRIAGE NOTES
Pt to ER via EMS from [facility].  Pt reported to have been pocketing carrots chunks in her cheeks during supper, staff believed they had removed them, however, found pt unresponsive and not breathing shortly after.  Staff was able to wake pt just prior to EMS arrival.  Pt mental status altered from baseline, gurgling heard.  Pt O2 was 90% on EMS arrival, placed on non rebreather.

## 2018-12-27 NOTE — DISCHARGE INSTRUCTIONS
Get plenty of fluids and rest.  Return to the ED if your symptoms worsen, otherwise, follow-up with PCP as needed.

## 2018-12-27 NOTE — ED NOTES
Pt son, Kashif, called update given.  Son states that if pt is acting up, turn tv on, pt will calm.  Phone: [phone]

## 2018-12-27 NOTE — ED NOTES
Reynaldo Gamboa called, states that pt current state is her norm.  at bedside, provider in to see.

## 2018-12-27 NOTE — ED NOTES
", Kashif, given report on phone, [phone].  He reports that normally she will respond to commands, though sometimes is slow to do so. Reports she does not always recognize him, will converse, but non coherent, un organized thoughts.  Pt has bad teeth that are to be pulled, just \"got over a bad UTI\".  Taking amoxicillin, to prevent oral infection.  He states normally alert to self only.   "

## 2018-12-27 NOTE — ED NOTES
Pt was ambulated in the franks with a transfer belt and stand by assist. Pt tolerated walking approx 50 feet. Pt settled to a chair.

## 2018-12-29 NOTE — ED PROVIDER NOTES
History     Chief Complaint   Patient presents with     Altered Mental Status     Aspiration     HPI  Katt Aldrich is a 84 year old female who presents to the ED via EMS with a chief complaint of AMS and choking. Pt is coming from [facility] where it is reported that the pt possibly choked on a carrot. They also reported that the pt was unresponsive with respiratory depression. Pt has underlying dementia, pt's  is present, and reports that the pt appears to be at her mental baseline. Pt denies pain, SOB.     Problem List:    Patient Active Problem List    Diagnosis Date Noted     Poor dentition 11/27/2018     Priority: Medium     Allergic rhinitis 02/22/2018     Priority: Medium     Myalgia and myositis 02/22/2018     Priority: Medium     Esophageal reflux 02/22/2018     Priority: Medium     Hiatal hernia 02/22/2018     Priority: Medium     Hypothyroidism 02/22/2018     Priority: Medium     Deep vein thrombosis (DVT) of right lower extremity (H) 11/12/2016     Priority: Medium     Dementia 11/12/2016     Priority: Medium     Headache 08/13/2013     Priority: Medium     Pulmonary fibrosis (H) 02/20/2012     Priority: Medium     Disorder of bone and cartilage 04/05/2006     Priority: Medium        Past Medical History:    Past Medical History:   Diagnosis Date     Diverticulosis of intestine without perforation or abscess without bleeding      Pulmonary fibrosis (H)      Pure hypercholesterolemia        Past Surgical History:    Past Surgical History:   Procedure Laterality Date     COLON SURGERY      01/2005,secondary to diverticulosis (sigmoid)     ESOPHAGOSCOPY, GASTROSCOPY, DUODENOSCOPY (EGD), COMBINED      06/19/2009     EXTRACAPSULAR CATARACT EXTRATION WITH INTRAOCULAR LENS IMPLANT      2009,right     HEMORRHOID SURGERY      surgery x 3     OTHER SURGICAL HISTORY      2005,[id number],BLEPHAROPLASTY,Eye lid surgery bilateral     TONSILLECTOMY, ADENOIDECTOMY, COMBINED      No Comments  "Provided       Family History:    Family History   Problem Relation Age of Onset     Arthritis Mother         Arthritis, hx of rheumatoid arthritis     Substance Abuse Father         Alcohol/Drug, complications of alcohol     Heart Disease Brother         Heart Disease,Hx of CAD     Diabetes Brother         Diabetes,Diabetes mellitus       Social History:  Marital Status:   [5]  Social History     Tobacco Use     Smoking status: Former Smoker     Packs/day: 0.50     Years: 26.00     Pack years: 13.00     Types: Cigarettes     Smokeless tobacco: Never Used   Substance Use Topics     Alcohol use: No     Drug use: Unknown     Types: Other     Comment: Drug use: No        Medications:      acetaminophen (TYLENOL) 325 MG tablet   BENZETHONIUM CL & PETROLATUM EX   busPIRone (BUSPAR) 10 MG tablet   Lanolin-Petrolatum 15.5-53.4 % OINT   levothyroxine (SYNTHROID/LEVOTHROID) 25 MCG tablet   levothyroxine (SYNTHROID/LEVOTHROID) 50 MCG tablet   LORazepam (ATIVAN) 0.5 MG tablet   senna (RA SENNA) 8.6 MG tablet   senna (SENNA-LAX) 8.6 MG tablet   Vitamins A & D (VITAMIN A & D) ointment         Review of Systems   All other systems reviewed and are negative.      Physical Exam   BP: 108/74  Pulse: 77  Heart Rate: 72  Temp: 97.4  F (36.3  C)  Resp: 20  Height: 165.1 cm (5' 5\")  Weight: 56 kg (123 lb 6.4 oz)  SpO2: 98 %      Physical Exam   Constitutional: She appears well-developed and well-nourished. No distress.   HENT:   Head: Normocephalic and atraumatic.   Eyes: Conjunctivae are normal. No scleral icterus.   Neck: Neck supple.   Cardiovascular: Normal rate and regular rhythm.   Pulmonary/Chest: Effort normal and breath sounds normal. No respiratory distress. She has no wheezes.   Abdominal: Soft. There is no tenderness.   Musculoskeletal: Normal range of motion. She exhibits no deformity.   Lymphadenopathy:     She has no cervical adenopathy.   Neurological: She is alert.   Skin: Skin is warm and dry. No rash noted. She " is not diaphoretic.   Psychiatric: She has a normal mood and affect.       ED Course        Procedures               Critical Care time:  none               No results found for this or any previous visit (from the past 24 hour(s)).    Medications - No data to display    Assessments & Plan (with Medical Decision Making)   Pt seen and examined. Nontoxic, in NAD. Heart, lung, bowel sounds normal. Abd soft, nontender, nondistended. Normal mental status according to  and friend. Hiatal hernia and left basilar atelectasis seen on CXR. PT is DNR/DNI. I discussed further testing options with , but he declined at this time. PT was road tested and did well. Strict return precautions given and pt was discharged.     Wilder Harris PA-C    I have reviewed the nursing notes.    I have reviewed the findings, diagnosis, plan and need for follow up with the patient.          Medication List      There are no discharge medications for this visit.         Final diagnoses:   Choking       12/26/2018   [facility]     Wilder Harris PA  12/28/18 [id number]

## 2019-01-01 ENCOUNTER — NURSING HOME VISIT (OUTPATIENT)
Dept: GERIATRICS | Facility: OTHER | Age: 84
End: 2019-01-01
Attending: NURSE PRACTITIONER
Payer: COMMERCIAL

## 2019-01-01 ENCOUNTER — NURSING HOME VISIT (OUTPATIENT)
Dept: GERIATRICS | Facility: OTHER | Age: 84
End: 2019-01-01
Attending: FAMILY MEDICINE
Payer: COMMERCIAL

## 2019-01-01 ENCOUNTER — OFFICE VISIT (OUTPATIENT)
Dept: FAMILY MEDICINE | Facility: OTHER | Age: 84
End: 2019-01-01
Attending: FAMILY MEDICINE
Payer: COMMERCIAL

## 2019-01-01 ENCOUNTER — MEDICAL CORRESPONDENCE (OUTPATIENT)
Dept: HEALTH INFORMATION MANAGEMENT | Facility: OTHER | Age: 84
End: 2019-01-01

## 2019-01-01 ENCOUNTER — TRANSFERRED RECORDS (OUTPATIENT)
Dept: HEALTH INFORMATION MANAGEMENT | Facility: OTHER | Age: 84
End: 2019-01-01

## 2019-01-01 ENCOUNTER — RESULTS ONLY (OUTPATIENT)
Dept: LAB | Age: 84
End: 2019-01-01

## 2019-01-01 ENCOUNTER — APPOINTMENT (OUTPATIENT)
Dept: GENERAL RADIOLOGY | Facility: OTHER | Age: 84
End: 2019-01-01
Attending: FAMILY MEDICINE
Payer: COMMERCIAL

## 2019-01-01 ENCOUNTER — TELEPHONE (OUTPATIENT)
Dept: FAMILY MEDICINE | Facility: OTHER | Age: 84
End: 2019-01-01

## 2019-01-01 ENCOUNTER — APPOINTMENT (OUTPATIENT)
Dept: GENERAL RADIOLOGY | Facility: OTHER | Age: 84
End: 2019-01-01
Attending: PHYSICIAN ASSISTANT
Payer: COMMERCIAL

## 2019-01-01 ENCOUNTER — HOSPITAL ENCOUNTER (EMERGENCY)
Facility: OTHER | Age: 84
Discharge: ANOTHER HOSPITAL | End: 2019-04-15
Attending: FAMILY MEDICINE | Admitting: FAMILY MEDICINE
Payer: COMMERCIAL

## 2019-01-01 ENCOUNTER — APPOINTMENT (OUTPATIENT)
Dept: CT IMAGING | Facility: OTHER | Age: 84
End: 2019-01-01
Attending: PHYSICIAN ASSISTANT
Payer: COMMERCIAL

## 2019-01-01 ENCOUNTER — HOSPITAL ENCOUNTER (EMERGENCY)
Facility: OTHER | Age: 84
Discharge: ANOTHER HOSPITAL | End: 2019-02-16
Attending: PHYSICIAN ASSISTANT | Admitting: PHYSICIAN ASSISTANT
Payer: COMMERCIAL

## 2019-01-01 VITALS — HEART RATE: 76 BPM | DIASTOLIC BLOOD PRESSURE: 104 MMHG | SYSTOLIC BLOOD PRESSURE: 118 MMHG | OXYGEN SATURATION: 100 %

## 2019-01-01 VITALS
BODY MASS INDEX: 20.49 KG/M2 | HEART RATE: 75 BPM | WEIGHT: 123 LBS | DIASTOLIC BLOOD PRESSURE: 70 MMHG | HEIGHT: 65 IN | OXYGEN SATURATION: 98 % | RESPIRATION RATE: 14 BRPM | TEMPERATURE: 96.6 F | SYSTOLIC BLOOD PRESSURE: 101 MMHG

## 2019-01-01 VITALS — TEMPERATURE: 98.5 F | SYSTOLIC BLOOD PRESSURE: 100 MMHG | HEART RATE: 64 BPM | DIASTOLIC BLOOD PRESSURE: 60 MMHG

## 2019-01-01 VITALS
OXYGEN SATURATION: 93 % | TEMPERATURE: 97.9 F | HEART RATE: 87 BPM | RESPIRATION RATE: 16 BRPM | SYSTOLIC BLOOD PRESSURE: 115 MMHG | DIASTOLIC BLOOD PRESSURE: 73 MMHG

## 2019-01-01 VITALS
BODY MASS INDEX: 20.49 KG/M2 | RESPIRATION RATE: 16 BRPM | OXYGEN SATURATION: 92 % | TEMPERATURE: 97.7 F | DIASTOLIC BLOOD PRESSURE: 68 MMHG | HEART RATE: 61 BPM | WEIGHT: 123 LBS | HEIGHT: 65 IN | SYSTOLIC BLOOD PRESSURE: 98 MMHG

## 2019-01-01 DIAGNOSIS — E03.9 HYPOTHYROIDISM, UNSPECIFIED TYPE: ICD-10-CM

## 2019-01-01 DIAGNOSIS — F02.81 ALZHEIMER'S DEMENTIA WITH BEHAVIORAL DISTURBANCE, UNSPECIFIED TIMING OF DEMENTIA ONSET: ICD-10-CM

## 2019-01-01 DIAGNOSIS — F03.90 DEMENTIA (H): ICD-10-CM

## 2019-01-01 DIAGNOSIS — G30.9 ALZHEIMER'S DEMENTIA WITH BEHAVIORAL DISTURBANCE, UNSPECIFIED TIMING OF DEMENTIA ONSET: ICD-10-CM

## 2019-01-01 DIAGNOSIS — K59.00 CONSTIPATION: Primary | ICD-10-CM

## 2019-01-01 DIAGNOSIS — M25.551 HIP PAIN, RIGHT: ICD-10-CM

## 2019-01-01 DIAGNOSIS — Z53.9 ERRONEOUS ENCOUNTER--DISREGARD: Primary | ICD-10-CM

## 2019-01-01 DIAGNOSIS — S80.211A ABRASION, RIGHT KNEE, INITIAL ENCOUNTER: ICD-10-CM

## 2019-01-01 DIAGNOSIS — K59.00 CONSTIPATION, UNSPECIFIED CONSTIPATION TYPE: ICD-10-CM

## 2019-01-01 DIAGNOSIS — I82.4Z1 DEEP VEIN THROMBOSIS (DVT) OF DISTAL VEIN OF RIGHT LOWER EXTREMITY, UNSPECIFIED CHRONICITY (H): ICD-10-CM

## 2019-01-01 DIAGNOSIS — M94.9 DISORDER OF BONE AND CARTILAGE: ICD-10-CM

## 2019-01-01 DIAGNOSIS — R63.8 DECREASED ORAL INTAKE: ICD-10-CM

## 2019-01-01 DIAGNOSIS — S72.002A CLOSED FRACTURE OF LEFT HIP, INITIAL ENCOUNTER (H): ICD-10-CM

## 2019-01-01 DIAGNOSIS — N39.498 OTHER URINARY INCONTINENCE: ICD-10-CM

## 2019-01-01 DIAGNOSIS — R52 PAIN: ICD-10-CM

## 2019-01-01 DIAGNOSIS — S72.001A CLOSED DISPLACED FRACTURE OF RIGHT FEMORAL NECK (H): ICD-10-CM

## 2019-01-01 DIAGNOSIS — F41.9 ANXIETY: Primary | ICD-10-CM

## 2019-01-01 DIAGNOSIS — Z87.81 S/P LEFT HIP FRACTURE: ICD-10-CM

## 2019-01-01 DIAGNOSIS — J84.10 PULMONARY FIBROSIS (H): ICD-10-CM

## 2019-01-01 DIAGNOSIS — Z87.81 S/P RIGHT HIP FRACTURE: Primary | ICD-10-CM

## 2019-01-01 DIAGNOSIS — K08.9 POOR DENTITION: ICD-10-CM

## 2019-01-01 DIAGNOSIS — M89.9 DISORDER OF BONE AND CARTILAGE: ICD-10-CM

## 2019-01-01 DIAGNOSIS — D64.9 POSTOPERATIVE ANEMIA: ICD-10-CM

## 2019-01-01 DIAGNOSIS — G30.9 ALZHEIMER'S DEMENTIA WITH BEHAVIORAL DISTURBANCE, UNSPECIFIED TIMING OF DEMENTIA ONSET: Primary | ICD-10-CM

## 2019-01-01 DIAGNOSIS — S72.001A: Primary | ICD-10-CM

## 2019-01-01 DIAGNOSIS — Z09 HOSPITAL DISCHARGE FOLLOW-UP: Primary | ICD-10-CM

## 2019-01-01 DIAGNOSIS — R52 PAIN, UNSPECIFIED: ICD-10-CM

## 2019-01-01 DIAGNOSIS — G30.1 LATE ONSET ALZHEIMER'S DISEASE WITH BEHAVIORAL DISTURBANCE (H): Primary | ICD-10-CM

## 2019-01-01 DIAGNOSIS — Z87.81 S/P RIGHT HIP FRACTURE: ICD-10-CM

## 2019-01-01 DIAGNOSIS — R26.2 UNABLE TO AMBULATE: ICD-10-CM

## 2019-01-01 DIAGNOSIS — S50.311A ABRASION OF RIGHT ELBOW, INITIAL ENCOUNTER: ICD-10-CM

## 2019-01-01 DIAGNOSIS — I10 HYPERTENSION, UNSPECIFIED TYPE: ICD-10-CM

## 2019-01-01 DIAGNOSIS — F02.818 LATE ONSET ALZHEIMER'S DISEASE WITH BEHAVIORAL DISTURBANCE (H): Primary | ICD-10-CM
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
## 2019-01-01 DIAGNOSIS — Z91.81 H/O FALL: ICD-10-CM

## 2019-01-01 DIAGNOSIS — F02.81 ALZHEIMER'S DEMENTIA WITH BEHAVIORAL DISTURBANCE, UNSPECIFIED TIMING OF DEMENTIA ONSET: Primary | ICD-10-CM

## 2019-01-01 DIAGNOSIS — Z86.718 HISTORY OF DEEP VENOUS THROMBOSIS: ICD-10-CM

## 2019-01-01 DIAGNOSIS — L22 DIAPER RASH: Primary | ICD-10-CM

## 2019-01-01 LAB
ANION GAP SERPL CALCULATED.3IONS-SCNC: 9 MMOL/L (ref 3–14)
BASOPHILS # BLD AUTO: 0.1 10E9/L (ref 0–0.2)
BASOPHILS NFR BLD AUTO: 0.9 %
BUN SERPL-MCNC: 18 MG/DL (ref 7–25)
CALCIUM SERPL-MCNC: 9.7 MG/DL (ref 8.6–10.3)
CHLORIDE SERPL-SCNC: 101 MMOL/L (ref 98–107)
CO2 SERPL-SCNC: 25 MMOL/L (ref 21–31)
CREAT SERPL-MCNC: 0.85 MG/DL (ref 0.6–1.2)
DIFFERENTIAL METHOD BLD: ABNORMAL
EOSINOPHIL # BLD AUTO: 0.2 10E9/L (ref 0–0.7)
EOSINOPHIL NFR BLD AUTO: 3.5 %
ERYTHROCYTE [DISTWIDTH] IN BLOOD BY AUTOMATED COUNT: 14.1 % (ref 10–15)
GFR SERPL CREATININE-BSD FRML MDRD: 64 ML/MIN/{1.73_M2}
GLUCOSE SERPL-MCNC: 89 MG/DL (ref 70–105)
HCT VFR BLD AUTO: 33.9 % (ref 35–47)
HGB BLD-MCNC: 10.9 G/DL (ref 11.7–15.7)
HGB BLD-MCNC: 11.3 G/DL (ref 11.7–15.7)
IMM GRANULOCYTES # BLD: 0 10E9/L (ref 0–0.4)
IMM GRANULOCYTES NFR BLD: 0.6 %
LYMPHOCYTES # BLD AUTO: 1.8 10E9/L (ref 0.8–5.3)
LYMPHOCYTES NFR BLD AUTO: 27.1 %
MCH RBC QN AUTO: 30.9 PG (ref 26.5–33)
MCHC RBC AUTO-ENTMCNC: 32.2 G/DL (ref 31.5–36.5)
MCV RBC AUTO: 96 FL (ref 78–100)
MONOCYTES # BLD AUTO: 0.5 10E9/L (ref 0–1.3)
MONOCYTES NFR BLD AUTO: 7.5 %
NEUTROPHILS # BLD AUTO: 3.9 10E9/L (ref 1.6–8.3)
NEUTROPHILS NFR BLD AUTO: 60.4 %
PLATELET # BLD AUTO: 393 10E9/L (ref 150–450)
POTASSIUM SERPL-SCNC: 4.8 MMOL/L (ref 3.5–5.1)
RBC # BLD AUTO: 3.53 10E12/L (ref 3.8–5.2)
SODIUM SERPL-SCNC: 135 MMOL/L (ref 134–144)
TSH SERPL DL<=0.05 MIU/L-ACNC: 1.98 IU/ML (ref 0.34–5.6)
WBC # BLD AUTO: 6.5 10E9/L (ref 4–11)

## 2019-01-01 PROCEDURE — 99285 EMERGENCY DEPT VISIT HI MDM: CPT | Mod: 25 | Performed by: FAMILY MEDICINE

## 2019-01-01 PROCEDURE — G0180 MD CERTIFICATION HHA PATIENT: HCPCS | Performed by: FAMILY MEDICINE

## 2019-01-01 PROCEDURE — 99285 EMERGENCY DEPT VISIT HI MDM: CPT | Mod: 25 | Performed by: PHYSICIAN ASSISTANT

## 2019-01-01 PROCEDURE — 99214 OFFICE O/P EST MOD 30 MIN: CPT | Performed by: FAMILY MEDICINE

## 2019-01-01 PROCEDURE — 73590 X-RAY EXAM OF LOWER LEG: CPT | Mod: RT

## 2019-01-01 PROCEDURE — 99309 SBSQ NF CARE MODERATE MDM 30: CPT | Performed by: NURSE PRACTITIONER

## 2019-01-01 PROCEDURE — 73552 X-RAY EXAM OF FEMUR 2/>: CPT | Mod: RT

## 2019-01-01 PROCEDURE — 12002 RPR S/N/AX/GEN/TRNK2.6-7.5CM: CPT | Performed by: FAMILY MEDICINE

## 2019-01-01 PROCEDURE — 73700 CT LOWER EXTREMITY W/O DYE: CPT | Mod: RT

## 2019-01-01 PROCEDURE — 12002 RPR S/N/AX/GEN/TRNK2.6-7.5CM: CPT | Mod: Z6 | Performed by: FAMILY MEDICINE

## 2019-01-01 PROCEDURE — 99285 EMERGENCY DEPT VISIT HI MDM: CPT | Mod: Z6 | Performed by: PHYSICIAN ASSISTANT

## 2019-01-01 PROCEDURE — 72170 X-RAY EXAM OF PELVIS: CPT

## 2019-01-01 PROCEDURE — G0463 HOSPITAL OUTPT CLINIC VISIT: HCPCS

## 2019-01-01 PROCEDURE — 25000125 ZZHC RX 250: Performed by: PHYSICIAN ASSISTANT

## 2019-01-01 PROCEDURE — 99284 EMERGENCY DEPT VISIT MOD MDM: CPT | Mod: Z6 | Performed by: FAMILY MEDICINE

## 2019-01-01 PROCEDURE — 73523 X-RAY EXAM HIPS BI 5/> VIEWS: CPT

## 2019-01-01 PROCEDURE — 72040 X-RAY EXAM NECK SPINE 2-3 VW: CPT

## 2019-01-01 RX ORDER — CALCIUM CARBONATE 500 MG/1
1 TABLET, CHEWABLE ORAL 3 TIMES DAILY
Qty: 270 TABLET | Refills: 3 | Status: SHIPPED | OUTPATIENT
Start: 2019-01-01

## 2019-01-01 RX ORDER — ACETAMINOPHEN 325 MG/1
650 TABLET ORAL 3 TIMES DAILY
Qty: 540 TABLET | Refills: 3 | Status: SHIPPED | OUTPATIENT
Start: 2019-01-01 | End: 2019-01-01

## 2019-01-01 RX ORDER — BUSPIRONE HYDROCHLORIDE 10 MG/1
10 TABLET ORAL EVERY EVENING
Qty: 90 TABLET | OUTPATIENT
Start: 2019-01-01

## 2019-01-01 RX ORDER — TRAMADOL HYDROCHLORIDE 50 MG/1
50 TABLET ORAL EVERY 6 HOURS PRN
Qty: 10 TABLET | Refills: 0 | OUTPATIENT
Start: 2019-01-01 | End: 2019-01-01

## 2019-01-01 RX ORDER — LEVOTHYROXINE SODIUM 50 UG/1
50 TABLET ORAL EVERY OTHER DAY
Qty: 30 TABLET | Refills: 5 | Status: SHIPPED | OUTPATIENT
Start: 2019-01-01

## 2019-01-01 RX ORDER — LEVOTHYROXINE SODIUM 25 UG/1
25 TABLET ORAL EVERY OTHER DAY
Qty: 45 TABLET | OUTPATIENT
Start: 2019-01-01

## 2019-01-01 RX ORDER — ASPIRIN 81 MG/1
81 TABLET, CHEWABLE ORAL 2 TIMES DAILY
Qty: 180 TABLET | OUTPATIENT
Start: 2019-01-01

## 2019-01-01 RX ORDER — LEVOTHYROXINE SODIUM 50 UG/1
50 TABLET ORAL EVERY OTHER DAY
Qty: 30 TABLET | Refills: 5 | Status: CANCELLED | OUTPATIENT
Start: 2019-01-01

## 2019-01-01 RX ORDER — CALCIUM CARBONATE 500 MG/1
1 TABLET, CHEWABLE ORAL 3 TIMES DAILY
Qty: 270 TABLET | OUTPATIENT
Start: 2019-01-01

## 2019-01-01 RX ORDER — ACETAMINOPHEN 325 MG/1
650 TABLET ORAL 4 TIMES DAILY
Qty: 540 TABLET | Refills: 3 | Status: SHIPPED | OUTPATIENT
Start: 2019-01-01

## 2019-01-01 RX ORDER — SENNOSIDES A AND B 8.6 MG/1
1 TABLET, FILM COATED ORAL 2 TIMES DAILY PRN
Qty: 180 TABLET | OUTPATIENT
Start: 2019-01-01

## 2019-01-01 RX ORDER — SENNOSIDES A AND B 8.6 MG/1
1 TABLET, FILM COATED ORAL 2 TIMES DAILY PRN
Qty: 180 TABLET | Refills: 1 | Status: SHIPPED | OUTPATIENT
Start: 2019-01-01

## 2019-01-01 RX ORDER — LEVOTHYROXINE SODIUM 50 UG/1
50 TABLET ORAL EVERY OTHER DAY
Qty: 30 TABLET | Refills: 5 | Status: SHIPPED | OUTPATIENT
Start: 2019-01-01 | End: 2019-01-01

## 2019-01-01 RX ORDER — BUSPIRONE HYDROCHLORIDE 10 MG/1
10 TABLET ORAL 2 TIMES DAILY
Qty: 180 TABLET | Refills: 3 | Status: SHIPPED | OUTPATIENT
Start: 2019-01-01

## 2019-01-01 RX ORDER — ASPIRIN 81 MG/1
TABLET, CHEWABLE ORAL
Refills: 0 | COMMUNITY
Start: 2019-01-01 | End: 2019-01-01

## 2019-01-01 RX ORDER — LEVOTHYROXINE SODIUM 50 UG/1
50 TABLET ORAL EVERY OTHER DAY
Qty: 45 TABLET | OUTPATIENT
Start: 2019-01-01

## 2019-01-01 RX ORDER — PETROLATUM,WHITE/LANOLIN
OINTMENT (GRAM) TOPICAL
Qty: 540 G | OUTPATIENT
Start: 2019-01-01

## 2019-01-01 RX ORDER — LEVOTHYROXINE SODIUM 25 UG/1
25 TABLET ORAL EVERY OTHER DAY
Qty: 30 TABLET | Refills: 5 | Status: SHIPPED | OUTPATIENT
Start: 2019-01-01 | End: 2019-01-01

## 2019-01-01 RX ORDER — TRAMADOL HYDROCHLORIDE 50 MG/1
TABLET ORAL
Refills: 0 | COMMUNITY
Start: 2019-01-01 | End: 2019-01-01

## 2019-01-01 RX ORDER — SODIUM CHLORIDE 9 MG/ML
INJECTION, SOLUTION INTRAVENOUS CONTINUOUS
Status: DISCONTINUED | OUTPATIENT
Start: 2019-01-01 | End: 2019-01-01 | Stop reason: HOSPADM

## 2019-01-01 RX ORDER — LEVOTHYROXINE SODIUM 25 UG/1
25 TABLET ORAL EVERY OTHER DAY
Qty: 30 TABLET | Refills: 5 | Status: SHIPPED | OUTPATIENT
Start: 2019-01-01

## 2019-01-01 RX ORDER — LEVOTHYROXINE SODIUM 25 UG/1
25 TABLET ORAL EVERY OTHER DAY
Qty: 30 TABLET | Refills: 5 | Status: CANCELLED | OUTPATIENT
Start: 2019-01-01

## 2019-01-01 RX ORDER — ACETAMINOPHEN 325 MG/1
650 TABLET ORAL 3 TIMES DAILY
Qty: 540 TABLET | OUTPATIENT
Start: 2019-01-01

## 2019-01-01 RX ORDER — BUSPIRONE HYDROCHLORIDE 10 MG/1
10 TABLET ORAL 2 TIMES DAILY
Qty: 180 TABLET | Refills: 3 | Status: SHIPPED | OUTPATIENT
Start: 2019-01-01 | End: 2019-01-01

## 2019-01-01 RX ORDER — MULTIVIT-MIN/IRON/FOLIC ACID/K 18-600-40
1 CAPSULE ORAL EVERY MORNING
Qty: 90 TABLET | OUTPATIENT
Start: 2019-01-01

## 2019-01-01 RX ORDER — ASPIRIN 81 MG/1
81 TABLET, CHEWABLE ORAL 2 TIMES DAILY
Qty: 24 TABLET | Refills: 0 | Status: SHIPPED | OUTPATIENT
Start: 2019-01-01 | End: 2019-01-01

## 2019-01-01 RX ORDER — PETROLATUM,WHITE/LANOLIN
OINTMENT (GRAM) TOPICAL PRN
Qty: 113 G | Refills: 3 | Status: SHIPPED | OUTPATIENT
Start: 2019-01-01

## 2019-01-01 RX ORDER — BUSPIRONE HYDROCHLORIDE 5 MG/1
5 TABLET ORAL EVERY MORNING
Qty: 90 TABLET | Refills: 3 | Status: SHIPPED | OUTPATIENT
Start: 2019-01-01 | End: 2019-01-01

## 2019-01-01 RX ORDER — LORAZEPAM 0.5 MG/1
0.5 TABLET ORAL EVERY 6 HOURS PRN
Qty: 60 TABLET | OUTPATIENT
Start: 2019-01-01

## 2019-01-01 RX ORDER — SENNOSIDES A AND B 8.6 MG/1
1 TABLET, FILM COATED ORAL 2 TIMES DAILY PRN
Qty: 180 TABLET | Refills: 1 | Status: SHIPPED | OUTPATIENT
Start: 2019-01-01 | End: 2019-01-01

## 2019-01-01 RX ORDER — BUSPIRONE HYDROCHLORIDE 10 MG/1
10 TABLET ORAL EVERY EVENING
Qty: 90 TABLET | Refills: 3 | Status: SHIPPED | OUTPATIENT
Start: 2019-01-01 | End: 2019-01-01

## 2019-01-01 RX ORDER — GINSENG 100 MG
500 CAPSULE ORAL ONCE
Status: COMPLETED | OUTPATIENT
Start: 2019-01-01 | End: 2019-01-01

## 2019-01-01 RX ORDER — FENTANYL CITRATE 50 UG/ML
25 INJECTION, SOLUTION INTRAMUSCULAR; INTRAVENOUS
Status: DISCONTINUED | OUTPATIENT
Start: 2019-01-01 | End: 2019-01-01 | Stop reason: HOSPADM

## 2019-01-01 RX ADMIN — BACITRACIN 1 G: 500 OINTMENT TOPICAL at 12:14

## 2019-01-01 ASSESSMENT — ENCOUNTER SYMPTOMS
ABDOMINAL PAIN: 0
ARTHRALGIAS: 0
SHORTNESS OF BREATH: 0
CONFUSION: 0
HEADACHES: 0
DIFFICULTY URINATING: 0
CHILLS: 0
COLOR CHANGE: 0
FEVER: 0
EYE REDNESS: 0
NECK STIFFNESS: 0

## 2019-01-01 ASSESSMENT — MIFFLIN-ST. JEOR
SCORE: 1008.8
SCORE: 1008.8

## 2019-02-16 NOTE — ED PROVIDER NOTES
History   No chief complaint on file.    This is a 84-year-old female who resides at [facility].  Staff there have noticed she has not been putting weight on her right leg.  She does appear to have an abrasion to her right knee as well as her right elbow and possibly has had an unwitnessed fall.  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  Otherwise she has severe dementia and evaluation is difficult.              Allergies:  Allergies   Allergen Reactions     Aspirin Buffered GI Disturbance     Codeine Unknown       Problem List:    Patient Active Problem List    Diagnosis Date Noted     Poor dentition 11/27/2018     Priority: Medium     Allergic rhinitis 02/22/2018     Priority: Medium     Myalgia and myositis 02/22/2018     Priority: Medium     Esophageal reflux 02/22/2018     Priority: Medium     Hiatal hernia 02/22/2018     Priority: Medium     Hypothyroidism 02/22/2018     Priority: Medium     Deep vein thrombosis (DVT) of right lower extremity (H) 11/12/2016     Priority: Medium     Dementia 11/12/2016     Priority: Medium     Headache 08/13/2013     Priority: Medium     Pulmonary fibrosis (H) 02/20/2012     Priority: Medium     Disorder of bone and cartilage 04/05/2006     Priority: Medium        Past Medical History:    Past Medical History:   Diagnosis Date     Diverticulosis of intestine without perforation or abscess without bleeding      Pulmonary fibrosis (H)      Pure hypercholesterolemia        Past Surgical History:    Past Surgical History:   Procedure Laterality Date     COLON SURGERY      01/2005,secondary to diverticulosis (sigmoid)     ESOPHAGOSCOPY, GASTROSCOPY, DUODENOSCOPY (EGD), COMBINED      06/19/2009     EXTRACAPSULAR CATARACT EXTRATION WITH INTRAOCULAR LENS IMPLANT      2009,right     HEMORRHOID SURGERY      surgery x 3     OTHER SURGICAL HISTORY      2005,[id number],BLEPHAROPLASTY,Eye lid surgery bilateral     TONSILLECTOMY,  ADENOIDECTOMY, COMBINED      No Comments Provided       Family History:    Family History   Problem Relation Age of Onset     Arthritis Mother         Arthritis, hx of rheumatoid arthritis     Substance Abuse Father         Alcohol/Drug, complications of alcohol     Heart Disease Brother         Heart Disease,Hx of CAD     Diabetes Brother         Diabetes,Diabetes mellitus       Social History:  Marital Status:   [5]  Social History     Tobacco Use     Smoking status: Former Smoker     Packs/day: 0.50     Years: 26.00     Pack years: 13.00     Types: Cigarettes     Smokeless tobacco: Never Used   Substance Use Topics     Alcohol use: No     Drug use: Unknown     Types: Other     Comment: Drug use: No        Medications:      acetaminophen (TYLENOL) 325 MG tablet   BENZETHONIUM CL & PETROLATUM EX   busPIRone (BUSPAR) 10 MG tablet   Lanolin-Petrolatum 15.5-53.4 % OINT   levothyroxine (SYNTHROID/LEVOTHROID) 25 MCG tablet   levothyroxine (SYNTHROID/LEVOTHROID) 50 MCG tablet   LORazepam (ATIVAN) 0.5 MG tablet   senna (RA SENNA) 8.6 MG tablet   senna (SENNA-LAX) 8.6 MG tablet   Vitamins A & D (VITAMIN A & D) ointment         Review of Systems   Unable to perform ROS: Dementia   Constitutional: Negative for chills and fever.   HENT: Negative for congestion.    Eyes: Negative for redness.   Respiratory: Negative for shortness of breath.    Cardiovascular: Negative for chest pain.   Gastrointestinal: Negative for abdominal pain.   Genitourinary: Negative for difficulty urinating.   Musculoskeletal: Negative for arthralgias and neck stiffness.        Right leg injury and pain.  Unable to bear weight on her right leg.  Right elbow 1 inch diameter abrasion and 1/2 inch diameter abrasion to her right knee.   Skin: Negative for color change.   Neurological: Negative for headaches.   Psychiatric/Behavioral: Negative for confusion.       Physical Exam          Physical Exam   Constitutional: No distress.   HENT:   Head:  Atraumatic.   Mouth/Throat: Oropharynx is clear and moist. No oropharyngeal exudate.   Eyes: Pupils are equal, round, and reactive to light. No scleral icterus.   Cardiovascular: Normal heart sounds and intact distal pulses.   Pulmonary/Chest: Breath sounds normal. No respiratory distress.   Abdominal: Soft. Bowel sounds are normal. There is no tenderness.   Musculoskeletal: She exhibits tenderness. She exhibits no edema.   Right leg injury and pain.  Unable to bear weight on her right leg.  Right elbow 1 inch diameter abrasion and 1/2 inch diameter abrasion to her right knee.  Patient basically hurts everywhere she is touched on the right leg.  She does not follow commands.   Skin: Skin is warm. No rash noted. She is not diaphoretic.       ED Course        Procedures             Results for orders placed or performed during the hospital encounter of 02/16/19 (from the past 24 hour(s))   XR Femur Right 2 Views    Narrative    PROCEDURE:  XR FEMUR RT 2 VW    HISTORY: pain    COMPARISON:  None.    TECHNIQUE:  2 views of the right femur were obtained.    FINDINGS:  The distal femur appears intact. There is some questionable  sclerosis in the femoral neck. It there is any question of femoral  neck fracture and would recommend a dedicated right hip x-ray be  obtained.       Impression    IMPRESSION: Questionable femoral neck fracture. Right hip x-ray  recommended.      OLYA GARZA MD   XR Tibia & Fibula Right 2 Views    Narrative    PROCEDURE:  XR TIBIA & FIBULA RT 2 VW    HISTORY: pain    COMPARISON:  None.    TECHNIQUE:  2 views of the right tibia and fibula were obtained.    FINDINGS:  No fracture or dislocation is identified. The joint spaces  are preserved.        Impression    IMPRESSION: No acute fracture.      OLYA GARZA MD   XR Pelvis 1/2 Views    Narrative    PROCEDURE: XR PELVIS 1/2 VW 2/16/2019 11:45 AM    HISTORY: pain    COMPARISONS: 2017    TECHNIQUE: 1 view    FINDINGS: There is some sclerosis  in the femoral neck which on the  right which was not seen in 2017. The possibility of an femoral neck  fracture on the right exists. Right hip x-ray is recommended. The left  hip appears normal. The pelvis is intact.         Impression    IMPRESSION: Questionable right hip fracture.    OLYA GARZA MD   CT Hip Right w/o Contrast    Narrative    PROCEDURE: CT HIP RIGHT W/O CONTRAST 2/16/2019 12:15 PM    HISTORY: Hip trauma, fx suspected, initial exam    COMPARISONS: None.    Meds/Dose Given:    TECHNIQUE: CT scan of the right hip with sagittal coronal  reconstructions    FINDINGS: There is an impacted femoral neck fracture on the right. The  acetabulum is intact. The right hemipelvis appears intact.         Impression    IMPRESSION: Impacted right femoral neck fracture.    OLYA GARZA MD       Medications - No data to display    Assessments & Plan (with Medical Decision Making)     I have reviewed the nursing notes.    I have reviewed the findings, diagnosis, plan and need for follow up with the patient.         Medication List      ASK your doctor about these medications    sulfamethoxazole-trimethoprim 800-160 MG tablet  Commonly known as:  BACTRIM DS  1 tablet, Oral, 2 TIMES DAILY  Ask about: Should I take this medication?            Final diagnoses:   Fall, initial encounter   Closed displaced fracture of right femoral neck (H)   Hip pain, right   Abrasion of right elbow, initial encounter   Abrasion, right knee, initial encounter   Dementia   Pulmonary fibrosis (H)   History of deep venous thrombosis     Afebrile.  Vital signs stable.  History of severe dementia.  Patient with unwitnessed fall at nursing home.  Abrasion to her right elbow as well as her right knee.  Staff noticed she was not putting weight on her right leg.  X-rays of the tib-fib area are unremarkable.  X-rays of the femur show suspicions for right hip fracture.  CT of right hip shows an impacted right femoral neck fracture minimally  displaced.  I discussed these findings with her spouse who would like her to be transferred to [facility].  IV was established for pain control.  I discussed the case with Dr. Blackmon and the patient will be transferred at this point to [facility] to come through their emergency room.  2/16/2019   [facility]     Mahesh Moser PA-C  02/16/19 [id number]

## 2019-02-16 NOTE — ED NOTES
Pt back to room from radiology,  at bedside.  Pt resting quietly.  Dressing placed to skin tears on right elbow and shin.

## 2019-02-16 NOTE — ED TRIAGE NOTES
[facility] staff said pt could not bear weight on right leg.  Staff denies pt has family.  brought pt in.  Pt presents with skin tear to right elbow, band aids to right knee.   Tender to palpation to right leg.  Pt is at neuro baseline.

## 2019-03-01 NOTE — TELEPHONE ENCOUNTER
We received a fax for discharge orders for patient. She is discharging to [facility] Tuesday from [facility]. The DOD said, patient needs to be seen in the clinic. I left message for Chayo MCMULLEN notifying her.    Mishel Sr LPN on 3/1/2019 at 9:48 AM

## 2019-03-01 NOTE — NURSING NOTE
Patient here with son for hospital follow up after surgery. She went from [facility] to [facility] and the plan is to return back to [facility] on Tuesday. Medication Reconciliation: complete.    Enriqueta Luke LPN  3/1/2019 10:20 AM

## 2019-03-01 NOTE — TELEPHONE ENCOUNTER
Patient seeing Dr. Moreno today and provider was given paperwork. Closing encounter.    Mishel Sr LPN on 3/1/2019 at 10:32 AM

## 2019-03-03 NOTE — PROGRESS NOTES
SUBJECTIVE:   Katt Aldrich is a 84 year old female who presents to clinic today for the following health issues: Hospital follow-up    Patient arrives here for hospital follow-up.  Recently a hospital and transferred to [facility].  She will be transferred to [facility] next Tuesday.  She is with her son who reports she had a hip fracture requiring pinning.  She is doing well except for somnolence.  She was started on Ultram.  Is also on Ativan.  He is not quite sure when her dose was but she is constantly falling forward in the wheelchair.  States that she is not usually like this          Patient Active Problem List    Diagnosis Date Noted     Poor dentition 11/27/2018     Priority: Medium     Allergic rhinitis 02/22/2018     Priority: Medium     Myalgia and myositis 02/22/2018     Priority: Medium     Esophageal reflux 02/22/2018     Priority: Medium     Hiatal hernia 02/22/2018     Priority: Medium     Hypothyroidism 02/22/2018     Priority: Medium     Deep vein thrombosis (DVT) of right lower extremity (H) 11/12/2016     Priority: Medium     Dementia 11/12/2016     Priority: Medium     Pulmonary fibrosis (H) 02/20/2012     Priority: Medium     Disorder of bone and cartilage 04/05/2006     Priority: Medium     Past Medical History:   Diagnosis Date     Diverticulosis of intestine without perforation or abscess without bleeding     No Comments Provided     Pulmonary fibrosis (H)     No Comments Provided     Pure hypercholesterolemia     No Comments Provided      Past Surgical History:   Procedure Laterality Date     COLON SURGERY      01/2005,secondary to diverticulosis (sigmoid)     ESOPHAGOSCOPY, GASTROSCOPY, DUODENOSCOPY (EGD), COMBINED      06/19/2009     EXTRACAPSULAR CATARACT EXTRATION WITH INTRAOCULAR LENS IMPLANT      2009,right     HEMORRHOID SURGERY      surgery x 3     OTHER SURGICAL HISTORY      2005,[id number],BLEPHAROPLASTY,Eye lid surgery bilateral     TONSILLECTOMY, ADENOIDECTOMY, COMBINED       No Comments Provided     Current Outpatient Medications   Medication Sig Dispense Refill     acetaminophen (TYLENOL) 325 MG tablet Take 2 tablets (650 mg) by mouth 3 times daily 540 tablet 3     aspirin (ASA) 81 MG chewable tablet CHEW AND SWALLOW 1 TAB BY MOUTH TWICE A DAY (BREAKFAST AND SUPPER) FOR 35 DAYS. TAKE WITH FOOD.  0     BENZETHONIUM CL & PETROLATUM EX Apply 1 Tube topically daily as needed       busPIRone (BUSPAR) 10 MG tablet Take 10 mg by mouth 2 times daily       Lanolin-Petrolatum 15.5-53.4 % OINT Apply topically daily as needed       levothyroxine (SYNTHROID/LEVOTHROID) 25 MCG tablet Take 1 tablet (25 mcg) by mouth every other day Alternate every other day with 50 mcg. 30 tablet 5     levothyroxine (SYNTHROID/LEVOTHROID) 50 MCG tablet Take 1 tablet (50 mcg) by mouth every other day Alternative every other day with 25 mcg tab. 30 tablet 5     LORazepam (ATIVAN) 0.5 MG tablet Take 1 tablet (0.5 mg) by mouth every 6 hours as needed 60 tablet 3     senna (RA SENNA) 8.6 MG tablet Take 1 tablet by mouth 2 times daily 180 tablet 3     senna (SENNA-LAX) 8.6 MG tablet TAKE 2 TABS (17.2MG) BY MOUTH EVERY OTHER DAY       traMADol (ULTRAM) 50 MG tablet TAKE 1 TABLET BY MOUTH EVERY 4 HOUR(S) AS NEEDED FOR PAIN  0     Vitamins A & D (VITAMIN A & D) ointment        Allergies   Allergen Reactions     Aspirin Buffered GI Disturbance     Codeine Unknown       Review of Systems     OBJECTIVE:     /60   Pulse 64   Temp 98.5  F (36.9  C)   There is no height or weight on file to calculate BMI.  Physical Exam   Constitutional:   Easily falls asleep   HENT:   Head: Normocephalic.   Cardiovascular: Normal rate.   Pulmonary/Chest: Effort normal.   Neurological:   Sleepy   Skin: Skin is warm.       none   ASSESSMENT/PLAN:         1. Alzheimer's dementia with behavioral disturbance, unspecified timing of dementia onset  With excessive somnolence.  His son reports that this is not her usual behavior.  I suspect and  this was discussed with son that this is probably due to Ultram and Ativan.  I do not have the most previous dose available.  They did not send me last months dosage schedule.  I had a long discussion with the son that at this time I do not believe she can be independent he will talk to nursing staff about dosage.  If she should wake up more she is medically cleared to go to [facility].        German Moreno MD  [facility] AND hospitals

## 2019-03-12 PROBLEM — Z91.81 H/O FALL: Status: ACTIVE | Noted: 2019-01-01

## 2019-03-12 PROBLEM — Z87.81 S/P RIGHT HIP FRACTURE: Status: ACTIVE | Noted: 2019-01-01

## 2019-03-12 NOTE — PROGRESS NOTES
[facility]  CLINIC & HOSPITAL - ELDER CARE    Katt Aldrich  : 1934  MRN: [id number]  Primary Care Physician: Marjan Blackwell  [facility] - memory unit.     3/12/2019     HPI: Katt Aldrich is a 84 year old female being seen today at [facility] for hospital follow up and Face to Face for DME- manual wheelchair with cushion. She was hospitalized at [facility] from -2019. Katt, who has advanced dementia and resides in memory unit here at [facility], fell on  and sustained a right hip fracture. She underwent surgery for right hip pinning per Dr. Elliott at [facility] on 2019. Her surgery and hospital stay were unremarkable. She was discharged to [facility] for short term rehab, on aspirin 81 mg BID for total of 5 weeks postop, and WBAT.    She came back to [facility] on 3/5/2019. Needs ongoing therapy.     Since her return, staff note Katt has been more sleepy but doing well overall. She has not needed anything for pain other than her scheduled acetaminophen.     Katt is sitting at the dining table. She is alert. She does ambulate to her room but requires assist of 2 staff to walk, previously had been independent with ambulation. She would benefit from a manual wc for safe mobility. She does have 24/7 staff available who can assist her with wc.   With her significant dementia, she is mostly non verbal. She does deny pain      Medication reconciliation: completed.     CODE STATUS: DNR/DNI    Patient Active Problem List    Diagnosis Date Noted     S/P right hip fracture 2019     Priority: Medium     H/O fall 2019     Priority: Medium     Poor dentition 2018     Priority: Medium     Allergic rhinitis 2018     Priority: Medium     Myalgia and myositis 2018     Priority: Medium     Esophageal reflux 2018     Priority: Medium     Hiatal hernia 2018     Priority: Medium     Hypothyroidism 2018     Priority:  Medium     Deep vein thrombosis (DVT) of right lower extremity (H) 11/12/2016     Priority: Medium     Dementia 11/12/2016     Priority: Medium     Pulmonary fibrosis (H) 02/20/2012     Priority: Medium     Disorder of bone and cartilage 04/05/2006     Priority: Medium     Past Medical History:   Diagnosis Date     Diverticulosis of intestine without perforation or abscess without bleeding     No Comments Provided     Pulmonary fibrosis (H)     No Comments Provided     Pure hypercholesterolemia     No Comments Provided     Social History     Socioeconomic History     Marital status:      Spouse name: Not on file     Number of children: Not on file     Years of education: Not on file     Highest education level: Not on file   Occupational History     Not on file   Social Needs     Financial resource strain: Not on file     Food insecurity:     Worry: Not on file     Inability: Not on file     Transportation needs:     Medical: Not on file     Non-medical: Not on file   Tobacco Use     Smoking status: Former Smoker     Packs/day: 0.50     Years: 26.00     Pack years: 13.00     Types: Cigarettes     Smokeless tobacco: Never Used   Substance and Sexual Activity     Alcohol use: No     Drug use: No     Comment: Drug use: No     Sexual activity: Not on file   Lifestyle     Physical activity:     Days per week: Not on file     Minutes per session: Not on file     Stress: Not on file   Relationships     Social connections:     Talks on phone: Not on file     Gets together: Not on file     Attends [employer] service: Not on file     Active member of club or organization: Not on file     Attends meetings of clubs or organizations: Not on file     Relationship status: Not on file     Intimate partner violence:     Fear of current or ex partner: Not on file     Emotionally abused: Not on file     Physically abused: Not on file     Forced sexual activity: Not on file   Other Topics Concern     Not on file   Social History  Narrative    She is .  Daughter age 51, twin sons age 48.  Has 4 step-sons. Now living at [facility] in [place].     Family History   Problem Relation Age of Onset     Arthritis Mother         Arthritis, hx of rheumatoid arthritis     Substance Abuse Father         Alcohol/Drug, complications of alcohol     Heart Disease Brother         Heart Disease,Hx of CAD     Diabetes Brother         Diabetes,Diabetes mellitus     Current Outpatient Medications   Medication Sig Dispense Refill     acetaminophen (TYLENOL) 325 MG tablet Take 2 tablets (650 mg) by mouth 3 times daily 540 tablet 3     aspirin (ASA) 81 MG chewable tablet CHEW AND SWALLOW 1 TAB BY MOUTH TWICE A DAY (BREAKFAST AND SUPPER) FOR 35 DAYS. TAKE WITH FOOD.  0     BENZETHONIUM CL & PETROLATUM EX Apply 1 Tube topically daily as needed       busPIRone (BUSPAR) 10 MG tablet Take 10 mg by mouth 2 times daily       Lanolin-Petrolatum 15.5-53.4 % OINT Apply topically daily as needed       levothyroxine (SYNTHROID/LEVOTHROID) 25 MCG tablet Take 1 tablet (25 mcg) by mouth every other day Alternate every other day with 50 mcg. 30 tablet 5     levothyroxine (SYNTHROID/LEVOTHROID) 50 MCG tablet Take 1 tablet (50 mcg) by mouth every other day Alternative every other day with 25 mcg tab. 30 tablet 5     LORazepam (ATIVAN) 0.5 MG tablet Take 1 tablet (0.5 mg) by mouth every 6 hours as needed 60 tablet 3     order for DME Equipment being ordered: Wheelchair, manual with wc cushion 1 each 0     senna (SENNA-LAX) 8.6 MG tablet Take 1 tablet by mouth 2 times daily as needed for constipation       Vitamins A & D (VITAMIN A & D) ointment        Allergies   Allergen Reactions     Aspirin Buffered GI Disturbance     Codeine Unknown       Review of systems:  Constitutional: no fever or chills. Sleeping well. Denies pain and has not used pain medication since her return.  Function: needs assist of 2 to walk since her return, receiving therapy services, needs  for safe  mobility.  Nutrition: fair appetite, adequate water intake  Skin: no rashes or sores  Head/Eyes/Ears/Nose/Throat: no report of headache, no new vision problems, no nasal discharge or sore throat.  Teeth: dentition in poor repair  Cardiovascular: no chest pains or palpitations, no edema  Respiratory: no cough or shortness of breath  Gastrointestinal: no dysphagia, abdominal pain, nausea, vomiting, or change in bowel habits; incontinent of bowel  Genitourinary: no change in urination, no frequency, urgency, dysuria, or blood in urine; incontinent of bladder  Neurologic: positive for dementia  Psychiatric: no reports of anxiety, depression, or panic      PHYSICAL EXAM:  Vitals per Nursing staff: Blood pressure (!) 118/104, pulse 76, SpO2 100 %.  GENERAL APPEARANCE: Well developed elderly, white female in no acute distress. Alert, confused.  SKIN: Warm and dry. Right hip incision site well healed.   HEENT: Head normocephalic, atraumatic. Pupils equal and reactive. Eyes without redness, drainage, or discharge. Nose without drainage, lips and mouth moist. Neck is supple.  LUNGS: Normal respirations, lung fields are clear to auscultation, no wheezes or rales.   HEART: Regular rhythm and rate; S1 and S2, no murmur, gallop or rub  ABDOMEN: Bowel sounds normal; Abdomen soft, no tenderness or guarding  NEUROLOGIC: She is confused but cooperative. Alert. MATTHEWS.   EXTREMITIES: No bilateral lower extremity edema. Warm to touch. No swelling or discoloration along right hip incision line, well healed.   PSYCHIATRIC: Smiles, limited verbal ability. Mood is good.      Labs: Post op hemoglobin 10.4  2/18/2019    ASSESSMENT:  Patient was seen for hospital discharge follow up following hip surgery. She returned to [facility] on 3/5/2019.     1. Hospital discharge follow-up    2. Alzheimer's dementia with behavioral disturbance, unspecified timing of dementia onset    3. S/P right hip fracture    4. Hypothyroidism, unspecified type    5.  H/O fall      PLAN:  She is doing well since her return. She has not needed anything for pain above and beyond her scheduled acetaminophen so will discontinue tramadol.   Continue aspirin BID for 5 weeks post op for DVT prophylaxis, ends 3/27/2019.   PT/OT following.   She would benefit from a manual wc for safe mobility, this is a Face to Face for DME-manual wheelchair At this time her ADL need for mobility cannot be met through use of cane or walker. She does have staff 24/7 to assist with wc mobility.     F/up in 6 months, sooner as needed.     Marjan Blackwell, JANE                Documentation of a Face-to-Face Physician Encounter March 12, 2019    Katt Aldrich  8/13/1934  [id number]    I certify that this patient is under my care and that I, or a nurse practitioner or physician's assistant working with me, had a face-to-face encounter that meets the physician face-to-face encounter requirements with this patient on: 3/12/2019.    The encounter with the patient was in whole, or in part, for the following medical condition, which is the primary reason for home health care: fall with hip fracture now s/p hip surgery.    I certify that, based on my findings, the following services are medically necessary home health services: Physical Therapy    My clinical findings support the need for the above services because: weakness and deconditioning due to hospitalization, h/o fall.     Further, I certify that my clinical findings support that this patient is homebound (i.e. absences from home require considerable and taxing effort and are for medical reasons or [employer] services or infrequently or of short duration when for other reasons) because: she is unable to leave her residence without the assistance of another person due to advanced dementia, h/o falls and is high risk for falls, and impaired mobility.     Physician signature ___________________________________   March 20, 2019  Physician name: Colin Garcia MD/  Marjan Blackwell, CNP    Fax ([phone]) this completed document to [facility].  Questions: [phone].

## 2019-03-19 NOTE — TELEPHONE ENCOUNTER
"Refill request from  for:  Tramadol, lorazepam, and Senna Plus      [facility] with PCP 3/12/2019  Noted tramadol was discontinued  \" She has not needed anything for pain above and beyond her scheduled acetaminophen so will discontinue tramadol\"    \"follow-up in 6 months\"    Noted only regular Senna 8.6 mg on med list.     Will route to PCP for refill consideration for Senna Plus and lorazepam.      Unable to complete prescription refill per RN Medication Refill Policy.................... Micaela Koehler ....................  3/19/2019   8:39 AM        "

## 2019-04-08 PROBLEM — R63.8 DECREASED ORAL INTAKE: Status: ACTIVE | Noted: 2019-01-01

## 2019-04-15 NOTE — ED NOTES
Provider notified nurse's decision to forego c-collar at this time due to pt's advanced dementia and anxiety. Applying a c-collar at this point would only exacerbate pt's anxiety and potentially aggravating pt's neck further. Pt unable to confirm if she has neck pain at this time.    Alessandra Agrawal RN on 4/15/2019 at 3:26 PM

## 2019-04-15 NOTE — ED PROVIDER NOTES
History     Chief Complaint   Patient presents with     Fall     HPI  Katt Aldrich is a 84 year old female who fell trying to get out of her wheelchair today.  She hit her head and lacerated her scalp.  Also, there may be some pain in her pelvic area.    She has not been medically unwell recently.    I spoke with her , in the room, about getting a CT scan regarding the fall to the head.  At this time, we decided to forego this imaging study, as she is acting at baseline, had no LOC, and would almost certainly not have a surgical procedure to her brain if there was indeed an intracranial bleed.        Allergies:  Allergies   Allergen Reactions     Aspirin Buffered GI Disturbance     Codeine Unknown       Problem List:    Patient Active Problem List    Diagnosis Date Noted     Decreased oral intake 04/08/2019     Priority: Medium     S/P right hip fracture 03/12/2019     Priority: Medium     H/O fall 03/12/2019     Priority: Medium     Poor dentition 11/27/2018     Priority: Medium     Allergic rhinitis 02/22/2018     Priority: Medium     Myalgia and myositis 02/22/2018     Priority: Medium     Esophageal reflux 02/22/2018     Priority: Medium     Hiatal hernia 02/22/2018     Priority: Medium     Hypothyroidism 02/22/2018     Priority: Medium     Deep vein thrombosis (DVT) of right lower extremity (H) 11/12/2016     Priority: Medium     Dementia 11/12/2016     Priority: Medium     Pulmonary fibrosis (H) 02/20/2012     Priority: Medium     Disorder of bone and cartilage 04/05/2006     Priority: Medium        Past Medical History:    Past Medical History:   Diagnosis Date     Diverticulosis of intestine without perforation or abscess without bleeding      Pulmonary fibrosis (H)      Pure hypercholesterolemia        Past Surgical History:    Past Surgical History:   Procedure Laterality Date     COLON SURGERY      01/2005,secondary to diverticulosis (sigmoid)     ESOPHAGOSCOPY, GASTROSCOPY, DUODENOSCOPY  "(EGD), COMBINED      06/19/2009     EXTRACAPSULAR CATARACT EXTRATION WITH INTRAOCULAR LENS IMPLANT      2009,right     HEMORRHOID SURGERY      surgery x 3     OTHER SURGICAL HISTORY      2005,[id number],BLEPHAROPLASTY,Eye lid surgery bilateral     TONSILLECTOMY, ADENOIDECTOMY, COMBINED      No Comments Provided       Family History:    Family History   Problem Relation Age of Onset     Arthritis Mother         Arthritis, hx of rheumatoid arthritis     Substance Abuse Father         Alcohol/Drug, complications of alcohol     Heart Disease Brother         Heart Disease,Hx of CAD     Diabetes Brother         Diabetes,Diabetes mellitus       Social History:  Marital Status:   [5]  Social History     Tobacco Use     Smoking status: Former Smoker     Packs/day: 0.50     Years: 26.00     Pack years: 13.00     Types: Cigarettes     Smokeless tobacco: Never Used   Substance Use Topics     Alcohol use: No     Drug use: No     Comment: Drug use: No        Medications:      acetaminophen (TYLENOL) 325 MG tablet   busPIRone (BUSPAR) 10 MG tablet   busPIRone (BUSPAR) 5 MG tablet   levothyroxine (SYNTHROID/LEVOTHROID) 25 MCG tablet   levothyroxine (SYNTHROID/LEVOTHROID) 50 MCG tablet   LORazepam (ATIVAN) 0.5 MG tablet   BENZETHONIUM CL & PETROLATUM EX   Lanolin-Petrolatum 15.5-53.4 % OINT   order for DME   senna (SENNA-LAX) 8.6 MG tablet   Vitamins A & D (VITAMIN A & D) ointment         Review of Systemshas not been otherwise unwell    Physical Exam   BP: 121/72  Pulse: 61  Temp: 97.7  F (36.5  C)  Resp: 16  Height: 165.1 cm (5' 5\")  Weight: 55.8 kg (123 lb)  SpO2: 95 %      Physical Exam  Well woman.  She appears to be at her baseline, according to .  PERRL.  EOMI.  She has advanced dementia.  A 2inch lac noted to her scalp, which was cleaned, inspected, irrigated, and stapled with 3 staples.  Heart reg.  Lungs clear.  abd soft, NT, ND.  No obvious injury to hips, or pelvis, distal legs or arms, or clavicle.  However, " we tried walking her, and she seemed to favor the left hip area.    With movement of the hips, she initially seemed to have equal pain responses;  considered this normal.    XR hip shows a femoral neck fx, left.    C-spine xray done given her advanced age, negative.    ED Course        Procedures               Critical Care time:  none               Results for orders placed or performed during the hospital encounter of 04/15/19 (from the past 24 hour(s))   XR Cervical Spine 2/3 Views    Narrative    XR CERVICAL SPINE 2/3 VWS    HISTORY: 84 years Female fall    COMPARISON: None    TECHNIQUE: Five-view cervical spine    FINDINGS: There is severe disc space narrowing of C3-C4, C4-C5, C5-C6  and C6-C7. There is no evidence of subluxation or fracture.      Impression    IMPRESSION: Multilevel degenerative disc disease of the cervical  spine. There is no evidence of subluxation or fracture.    AIMEE LINN MD   XR Pelvis and Hip Bilateral 2 Views    Addendum: 4/15/2019    2 views of the left hip: An impacted fracture of the femoral neck was  confirmed. The acetabulum is intact.    2 views of the right hip. There are pins seen within the femoral neck  on the left. These are seated within the femoral head in 2  projections. No acute proximal femoral fracture is seen on the right.    OLYA GARZA MD      Narrative    PROCEDURE: XR PELVIS AND HIP BILATERAL 2 VIEWS 4/15/2019 5:24 PM    HISTORY: fall    COMPARISONS: None.    TECHNIQUE: 1 view pelvis    FINDINGS: There is sclerosis in the femoral neck on the left.  Additional left hip x-rays are recommended. This lesion is suspicious  for an impacted femoral neck fracture on the left. There is been  internal fixation of the right hip with 3 pins. The pelvis is intact.  Sacrum and sacroiliac joints appear normal.         Impression    IMPRESSION: Possible impacted femoral neck fracture on the left    OLYA GARZA MD       Medications - No data to  display    Assessments & Plan (with Medical Decision Making)     I have reviewed the nursing notes.    I have reviewed the findings, diagnosis, plan and need for follow up with the patient.   [facility] as we have no [facility] services here at [facility].      Not requiring pain medication at this time.  Will be NPO.       Medication List      There are no discharge medications for this visit.         Final diagnoses:   Closed fracture of left hip, initial encounter (H)       4/15/2019   [facility]     Wei Cisneros MD  04/15/19 [id number]

## 2019-04-15 NOTE — ED TRIAGE NOTES
Pt arrives via private vehicle from [facility]. Pt had a fall and posterior head lac today around 1400. Fall witnessed by staff. Pt had recent hip surgery 2 months ago. Pt has advanced dementia.    Alessandra Agrawal RN on 4/15/2019 at 3:17 PM

## 2019-04-19 NOTE — TELEPHONE ENCOUNTER
After verifying pts name and date of birth with staff at [facility], Slava was notified of Dr. Rodriguez's message below.  Nancy Araiza

## 2019-05-10 NOTE — TELEPHONE ENCOUNTER
[facility] #[id number] sent Rx request for the following:     vitamin A & D (BABY) external ointment  Last Prescription Date:   8/24/17  Last Fill Qty/Refills:         454g, R-3    Available OTC, but not ordered since 2017.    traMADol (ULTRAM) 50 MG tablet  Sig: Take 1 tablet (50 mg) by mouth every 6 hours as needed for severe pain    Drug not on the [facility] refill protocol     Drug not active on patient's medication list    Per 4/15/19 from [facility], Tramadol was on medication list at time of discharge and notes fill of #20, R-0.    aspirin (ASA) 81 MG chewable tablet  Sig: Take 1 tablet (81 mg) by mouth 2 times daily  Last Prescription Date:   3/15/19  Last Fill Qty/Refills:         24, R-0 (End: 4/2/19)      Discontinued 4/2/19; reason: Therapy completed.    Analgesics (Non-Narcotic Tylenol and ASA Only) Failed5/10 10:47 AM   Medication is active on med list     Per 4/15/19 from [facility], Aspirin was on medication list at time of discharge and notes fill of #60, R-0, with end date of 5/19/19, and with instructions to take for 30 days. DVT/PE prophylaxis plan:Aspirin 81 mg twice a day for a total of 4 weeks postoperatively.    Vitamin D, Cholecalciferol, 1000 units TABS  Sig: Take 1 tablet (1,000 Units) by mouth every morning    Vitamin Supplements (Adult) Protocol Failed5/10 10:47 AM   Medication is active on med list     Per 4/15/19 from [facility], Vitamin D was on medication list at time of discharge and notes fill of #60, R-0, with end date of 5/19/19, and with instructions to take for 30 days.     calcium carbonate (CALCIUM ANTACID) 500 MG chewable tablet  Sig: Take 1 tablet (500 mg) by mouth 3 times daily    Drug not active on patient's medication list    Per 4/15/19 from [facility], Tums was on medication list at time of discharge and notes fill of #60, R-0, with end date of 5/19/19, and with instructions to take for 30 days.     Last Office Visit:               3/12/19  Future Office visit:           None.    Writer to contact [facility] for more information. Yael Henderson RN .............. 5/10/2019  3:40 PM

## 2019-05-10 NOTE — TELEPHONE ENCOUNTER
[facility] #[id number] sent Rx request for the following:      busPIRone (BUSPAR) 10 MG tablet 90 tablet 3 4/2/2019  No   Sig - Route: Take 1 tablet (10 mg) by mouth every evening - Oral   Sent to pharmacy as: busPIRone (BUSPAR) 10 MG tablet   Class: E-Prescribe   Order: [id number]   E-Prescribing Status: Receipt confirmed by pharmacy (4/2/2019 12:34 PM CDT)     levothyroxine (SYNTHROID/LEVOTHROID) 25 MCG tablet 30 tablet 5 3/15/2019  No   Sig - Route: Take 1 tablet (25 mcg) by mouth every other day Alternate every other day with 50 mcg. - Oral   Sent to pharmacy as: levothyroxine (SYNTHROID/LEVOTHROID) 25 MCG tablet   Class: E-Prescribe   Order: [id number]   E-Prescribing Status: Receipt confirmed by pharmacy (3/15/2019  2:00 PM CDT)     levothyroxine (SYNTHROID/LEVOTHROID) 50 MCG tablet 30 tablet 5 3/15/2019  No   Sig - Route: Take 1 tablet (50 mcg) by mouth every other day Alternative every other day with 25 mcg tab. - Oral   Sent to pharmacy as: levothyroxine (SYNTHROID/LEVOTHROID) 50 MCG tablet   Class: E-Prescribe   Order: [id number]   E-Prescribing Status: Receipt confirmed by pharmacy (3/15/2019  2:00 PM CDT)     acetaminophen (TYLENOL) 325 MG tablet 540 tablet 3 3/15/2019  No   Sig - Route: Take 2 tablets (650 mg) by mouth 3 times daily - Oral   Sent to pharmacy as: acetaminophen (TYLENOL) 325 MG tablet   Class: E-Prescribe   Order: [id number]   E-Prescribing Status: Receipt confirmed by pharmacy (3/15/2019  1:59 PM CDT)     senna (SENNA-LAX) 8.6 MG tablet 180 tablet 1 3/19/2019  No   Sig - Route: Take 1 tablet by mouth 2 times daily as needed for constipation - Oral   Sent to pharmacy as: senna (SENNA-LAX) 8.6 MG tablet   Class: E-Prescribe   Order: [id number]   E-Prescribing Status: Receipt confirmed by pharmacy (3/19/2019 10:32 AM CDT)     [facility] #[id number] - [facility] [address]     Unable to complete prescription refill per RN Medication Refill Policy. Yael Henderson, KEMI ..............  5/10/2019  11:00 AM

## 2019-05-10 NOTE — TELEPHONE ENCOUNTER
Called and spoke with Amparo at . She states Pt discharged to [facility] 2 days ago.    Called [facility], and spoke with CHAKA Caceres, who is caring for Patient. Nicki states she rounds with Marjan Blackwell each week. Marjan is scheduled to see Pt on Tuesday 5/14. She notes no medication was transferred from , and what they had, was likely destroyed, when Pt transferred to [facility]. She notes medication list in Patient's EMR is same as [facility] EMR, however accuracy is questionable, due to notes on [facility] discharge note.    She confirmed that Pt has enough Levothyroxine (both strengths), Buspar, Senna, Baby Aspirin and Tramadol. Pt will run out of Tylenol before next cycle of bubble packs is prepared. Pt has no Vitamin D or Calcium. Nicki request we do not persue refill of A&D, as a different barrier cream will be requested. Nicki notes refill requests can be disregarded at this time, and she will work with Marjan on 5/14, to reconcile medications and request refills from pharmacy, as appropriate.     Refill requests refused, with note to pharmacy. Unable to complete prescription refill per RN Medication Refill Policy. Yael Henderson RN .............. 5/10/2019  3:39 PM

## 2019-05-14 PROBLEM — Z87.81 S/P LEFT HIP FRACTURE: Status: ACTIVE | Noted: 2019-01-01

## 2019-05-14 NOTE — PROGRESS NOTES
[facility]    Katt Aldrich  : 1934  MRN: [id number]  Primary Care Physician: Marjan Blackwell  [facility] unit.     2019      HPI: Katt Aldrich is a 84 year old female being seen today at [facility] for f/up. Katt, who has significant dementia and resides in memory unit, returned here to [facility] on 2019 following a brief stay at [facility]. She required SNF placement due to surgical repair of left hip fracture sustained from a fall on 4/15.   In mid February, she underwent surgical repair for right hip fracture after a fall. Katt was doing fairly well following right hip surgery however she has had a significant decline in her functional mobility following recent left hip fracture and surgery.     Her activity level is WBAT however since her return to [facility], staff report she has not been ambulating or standing. She is a Erika lift for all transfers. She is not appropriate for therapy services given her advanced dementia. She c/o left hip pain with transfers on occasion. She is presently taking scheduled acetaminophen QID, has not taken any tramadol.   She is on low-dose aspirin BID for DVT prophylaxis.   She does have h/o DVT.     With her significant dementia, she is mostly non verbal. She does deny pain of any type at this time.       Medication reconciliation: completed.     CODE STATUS: DNR/DNI    Patient Active Problem List    Diagnosis Date Noted     S/p left hip fracture 04/15/2019     Priority: Medium     Decreased oral intake 2019     Priority: Medium     S/P right hip fracture 2019     Priority: Medium     H/O fall 2019     Priority: Medium     Poor dentition 2018     Priority: Medium     Allergic rhinitis 2018     Priority: Medium     Myalgia and myositis 2018     Priority: Medium     Esophageal reflux 2018     Priority: Medium     Hiatal hernia 2018      Priority: Medium     Hypothyroidism, unspecified 02/22/2018     Priority: Medium     Deep vein thrombosis (DVT) of right lower extremity (H) 11/12/2016     Priority: Medium     Alzheimer's dementia with behavioral disturbance 11/12/2016     Priority: Medium     Pulmonary fibrosis (H) 02/20/2012     Priority: Medium     Disorder of bone and cartilage 04/05/2006     Priority: Medium     Past Medical History:   Diagnosis Date     Diverticulosis of intestine without perforation or abscess without bleeding     No Comments Provided     Pulmonary fibrosis (H)     No Comments Provided     Pure hypercholesterolemia     No Comments Provided     Social History     Socioeconomic History     Marital status:      Spouse name: Not on file     Number of children: Not on file     Years of education: Not on file     Highest education level: Not on file   Occupational History     Not on file   Social Needs     Financial resource strain: Not on file     Food insecurity:     Worry: Not on file     Inability: Not on file     Transportation needs:     Medical: Not on file     Non-medical: Not on file   Tobacco Use     Smoking status: Former Smoker     Packs/day: 0.50     Years: 26.00     Pack years: 13.00     Types: Cigarettes     Smokeless tobacco: Never Used   Substance and Sexual Activity     Alcohol use: No     Drug use: No     Comment: Drug use: No     Sexual activity: Not on file   Lifestyle     Physical activity:     Days per week: Not on file     Minutes per session: Not on file     Stress: Not on file   Relationships     Social connections:     Talks on phone: Not on file     Gets together: Not on file     Attends [employer] service: Not on file     Active member of club or organization: Not on file     Attends meetings of clubs or organizations: Not on file     Relationship status: Not on file     Intimate partner violence:     Fear of current or ex partner: Not on file     Emotionally abused: Not on file      Physically abused: Not on file     Forced sexual activity: Not on file   Other Topics Concern     Not on file   Social History Narrative    She is .  Daughter age 51, twin sons age 48.  Has 4 step-sons. Now living at [facility] in [facility].     Family History   Problem Relation Age of Onset     Arthritis Mother         Arthritis, hx of rheumatoid arthritis     Substance Abuse Father         Alcohol/Drug, complications of alcohol     Heart Disease Brother         Heart Disease,Hx of CAD     Diabetes Brother         Diabetes,Diabetes mellitus     Current Outpatient Medications   Medication Sig Dispense Refill     acetaminophen (TYLENOL) 325 MG tablet Take 2 tablets (650 mg) by mouth 4 times daily 540 tablet 3     aspirin (ASA) 81 MG tablet TAKE 81 MG PO BID THROUGH 5/16 THEN REDUCE TO 81 MG PO every day EFFECTIVE 5/17. 90 tablet 3     BENZETHONIUM CL & PETROLATUM EX Apply 1 Tube topically daily as needed       busPIRone (BUSPAR) 10 MG tablet Take 1 tablet (10 mg) by mouth 2 times daily 180 tablet 3     calcium carbonate (TUMS) 500 MG chewable tablet Take 1 tablet (500 mg) by mouth 3 times daily 270 tablet 3     Lanolin-Petrolatum 15.5-53.4 % OINT Apply topically daily as needed       levothyroxine (SYNTHROID/LEVOTHROID) 25 MCG tablet Take 1 tablet (25 mcg) by mouth every other day Alternate every other day with 50 mcg. 30 tablet 5     levothyroxine (SYNTHROID/LEVOTHROID) 50 MCG tablet Take 1 tablet (50 mcg) by mouth every other day Alternative every other day with 25 mcg tab. 30 tablet 5     LORazepam (ATIVAN) 0.5 MG tablet Take 1 tablet (0.5 mg) by mouth every 6 hours as needed 60 tablet 3     vitamin D3 (CHOLECALCIFEROL) 1000 units (25 mcg) tablet Take 1 tablet (1,000 Units) by mouth daily 90 tablet 3     Vitamins A & D (VITAMIN A & D) ointment        order for DME Equipment being ordered: Wheelchair, manual with wc cushion 1 each 0     senna (SENNA-LAX) 8.6 MG tablet Take 1 tablet by mouth 2 times daily  as needed for constipation 180 tablet 1     Allergies   Allergen Reactions     Aspirin Buffered GI Disturbance     Codeine Unknown       Review of systems: per staff, chart.   Constitutional: no fever or chills. Sleeping well. Takes scheduled acetaminophen and denies pain at present, has not used prn tramadol since her return.  Function: Erika lift for transfers, extensive for all cares including eating.  Nutrition: fair appetite. Staff report she does pocket her foot at times. Takes Boost supplement.  Skin: skin tear to left lower arm  Head/Eyes/Ears/Nose/Throat: no report of headache, no new vision problems, no nasal discharge or sore throat.  Teeth: dentition in poor repair  Cardiovascular: no chest pains or palpitations, no edema  Respiratory: no cough or shortness of breath  Gastrointestinal: no dysphagia, abdominal pain, nausea, vomiting, or change in bowel habits; incontinent of bowel  Genitourinary: no change in urination, no frequency, urgency, dysuria, or blood in urine; incontinent of bladder  Neurologic: positive for dementia, minimally verbal  Psychiatric: no reports of anxiety, depression, or panic      PHYSICAL EXAM:  Vitals per Nursing staff: Blood pressure 115/73, pulse 87, temperature 97.9  F (36.6  C), resp. rate 16, SpO2 93 %.RA  GENERAL APPEARANCE: Thin, elderly, white female in no acute distress. Alert, confused.  SKIN: Warm, pale. Skin tear to left lower arm with tegaderm dressing covering. Well healed surgical incision to left hip; well healed surgical incision to right hip.    HEENT: Head normocephalic, atraumatic. Eyes without redness, drainage, or discharge. Nose without drainage, lips and mouth moist - limited dentition in poor repair. Neck is supple.  LUNGS: Normal respirations, lung fields are clear to auscultation  HEART: Regular rhythm and rate; S1 and S2, no murmur, gallop or rub  ABDOMEN: Bowel sounds normal; Abdomen soft, no tenderness or guarding  NEUROLOGIC: She is confused but  cooperative. Alert. MATTHEWS.   EXTREMITIES: No bilateral lower extremity edema. Warm to touch.   PSYCHIATRIC: Smiles, limited verbal ability. Mood is good.      Labs:   Component      Latest Ref Rng & Units 5/6/2019   WBC      4.0 - 11.0 10e9/L 6.5   RBC Count      3.8 - 5.2 10e12/L 3.53 (L)   Hemoglobin      11.7 - 15.7 g/dL 10.9 (L)   Hematocrit      35.0 - 47.0 % 33.9 (L)   MCV      78 - 100 fl 96   MCH      26.5 - 33.0 pg 30.9   MCHC      31.5 - 36.5 g/dL 32.2   RDW      10.0 - 15.0 % 14.1   Platelet Count      150 - 450 10e9/L 393   Diff Method       Automated Method   % Neutrophils      % 60.4   % Lymphocytes      % 27.1   % Monocytes      % 7.5   % Eosinophils      % 3.5   % Basophils      % 0.9   % Immature Granulocytes      % 0.6   Absolute Neutrophil      1.6 - 8.3 10e9/L 3.9   Absolute Lymphocytes      0.8 - 5.3 10e9/L 1.8   Absolute Monocytes      0.0 - 1.3 10e9/L 0.5   Absolute Eosinophils      0.0 - 0.7 10e9/L 0.2   Absolute Basophils      0.0 - 0.2 10e9/L 0.1   Abs Immature Granulocytes      0 - 0.4 10e9/L 0.0   Sodium      134 - 144 mmol/L 135   Potassium      3.5 - 5.1 mmol/L 4.8   Chloride      98 - 107 mmol/L 101   Carbon Dioxide      21 - 31 mmol/L 25   Anion Gap      3 - 14 mmol/L 9   Glucose      70 - 105 mg/dL 89   Urea Nitrogen      7 - 25 mg/dL 18   Creatinine      0.60 - 1.20 mg/dL 0.85   GFR Estimate      >60 mL/min/1.73:m2 64   GFR Estimate If Black      >60 mL/min/1.73:m2 77   Calcium      8.6 - 10.3 mg/dL 9.7       ASSESSMENT/ PLAN:  Patient is seen for follow up following left hip surgery. She returned to [facility] on 5/8/2019 following brief stay at [facility]. Medication reconciliation completed.     1. Alzheimer's dementia with behavioral disturbance, unspecified timing of dementia onset  - progression, no significant behavior disturbances.   - there were no changes to her present medications, updated under reconciliation.   - continue supportive care.  - busPIRone (BUSPAR) 10  MG tablet; Take 1 tablet (10 mg) by mouth 2 times daily  Dispense: 180 tablet; Refill: 3  - vitamin D3 (CHOLECALCIFEROL) 1000 units (25 mcg) tablet; Take 1 tablet (1,000 Units) by mouth daily  Dispense: 90 tablet; Refill: 3    2. H/O fall  3. Unable to ambulate  - she is no longer walking, is wc dependent.  - with her advanced dementia, she is not appropriate for therapy.     4. Pain  5. S/p left hip fracture  6. Postoperative anemia  - patient's pain seems to be well controlled with scheduled acetaminophen, she does have prn tramadol if necessary.   - left hip surgical incision well healed.  - no recent record of iron supplementation or vitamin c, recheck of her hemoglobin shows good rebound from 9.3 to 10.9. Will hold off on starting supplements, recheck hemoglobin in 2 weeks.   - has ortho f/up on 5/29  - calcium carbonate (TUMS) 500 MG chewable tablet; Take 1 tablet (500 mg) by mouth 3 times daily  Dispense: 270 tablet; Refill: 3  - aspirin (ASA) 81 MG tablet; TAKE 81 MG PO BID THROUGH 5/19 THEN REDUCE TO 81 MG PO every day EFFECTIVE 5/20.  Dispense: 90 tablet; Refill: 3    7. Deep vein thrombosis (DVT) of distal vein of right lower extremity, unspecified chronicity (H)  - post op orders for low-dose aspirin BID through 5/19 for post op DVT prophylaxis. With her h/o prior DVT, will continue low dose aspirin once daily after post op course completed.  - aspirin (ASA) 81 MG tablet; TAKE 81 MG PO BID THROUGH 5/19 THEN REDUCE TO 81 MG PO every day EFFECTIVE 5/20.  Dispense: 90 tablet; Refill: 3    8. Poor dentition  9. Decreased oral intake  - no signs of acute infection of dentition.  - staff to continue to assist with eating, good oral hygiene cares  - continue Boost for nutritional supplementation.     Marjan Blackwell, CNP

## 2019-05-14 NOTE — PROGRESS NOTES
[facility]  Acute Care Visit    Patient Name: Katt Aldrich   : 1934  MRN: [id number]    Place of Service: [facility]  DOS: 2019    CC: s/p Hospital follow up     HPI:  Katt Aldrich is a 84 year old female with PMH of multiple chronic medical concerns, who is seen today regarding hospital follow up 4/15- at [facility] for left hip fracture after a fall and also sustained a left head laceration which required staples. Prior to fall, had resided at [facility] but is requiring more care and therapy due to dementia, however family is still hoping to get her back there. She is alert to self. Taking tylenol for pain. She is having an ortho follow up May 29th.   Due for post op labs.       Multidisciplinary notes, laboratory values, medications, vital signs, weight and orders arereviewed from nursing home records. I have reviewed the patient s medical history and updated the computerized patient record.     PMH:  Past Medical History:   Diagnosis Date     Diverticulosis of intestine without perforation or abscess without bleeding     No Comments Provided     Pulmonary fibrosis (H)     No Comments Provided     Pure hypercholesterolemia     No Comments Provided       Medications:  Current Outpatient Medications   Medication Sig Dispense Refill     acetaminophen (TYLENOL) 325 MG tablet Take 2 tablets (650 mg) by mouth 3 times daily 540 tablet 3     BENZETHONIUM CL & PETROLATUM EX Apply 1 Tube topically daily as needed       busPIRone (BUSPAR) 10 MG tablet Take 1 tablet (10 mg) by mouth every evening 90 tablet 3     Lanolin-Petrolatum 15.5-53.4 % OINT Apply topically daily as needed       levothyroxine (SYNTHROID/LEVOTHROID) 25 MCG tablet Take 1 tablet (25 mcg) by mouth every other day Alternate every other day with 50 mcg. 30 tablet 5     levothyroxine (SYNTHROID/LEVOTHROID) 50 MCG tablet Take 1 tablet (50 mcg) by mouth every other day Alternative every other day with 25 mcg tab. 30  tablet 5     LORazepam (ATIVAN) 0.5 MG tablet Take 1 tablet (0.5 mg) by mouth every 6 hours as needed 60 tablet 3     order for DME Equipment being ordered: Wheelchair, manual with wc cushion 1 each 0     senna (SENNA-LAX) 8.6 MG tablet Take 1 tablet by mouth 2 times daily as needed for constipation 180 tablet 1     Vitamins A & D (VITAMIN A & D) ointment        Medication reconciliation complete between Epic record and NH MAR.     Allergies:  Allergies   Allergen Reactions     Aspirin Buffered GI Disturbance     Codeine Unknown       Review of Systems:  Limited ROS due to dementia. See HPI.   Denies pain    Vital Signs:  Temp 97.7   Pulse 87   Respirations 18   BP 95/50   Oxygen Sats 98%   Weight 117#    Physical Exam:     Pleasant and alert to self, without distress.    Sclera nonicteric, conjunctiva non-inflamed.  Skin color pink.   Neck supple and without adenopathy   Lungs clear   Cardiovascular regular  Abdomen soft and without tenderness   Extremities without edema.   Laceration to left side of head is healed.        New Labs/Diagnostics:  Lab Results   Component Value Date    WBC 6.5 05/06/2019     Lab Results   Component Value Date    RBC 3.53 05/06/2019     Lab Results   Component Value Date    HGB 10.9 05/06/2019     Lab Results   Component Value Date    HCT 33.9 05/06/2019     No components found for: MCT  Lab Results   Component Value Date    MCV 96 05/06/2019     Lab Results   Component Value Date    MCH 30.9 05/06/2019     Lab Results   Component Value Date    MCHC 32.2 05/06/2019     Lab Results   Component Value Date    RDW 14.1 05/06/2019     Lab Results   Component Value Date     05/06/2019     Last Comprehensive Metabolic Panel:  Sodium   Date Value Ref Range Status   05/06/2019 135 134 - 144 mmol/L Final     Potassium   Date Value Ref Range Status   05/06/2019 4.8 3.5 - 5.1 mmol/L Final     Chloride   Date Value Ref Range Status   05/06/2019 101 98 - 107 mmol/L Final     Carbon Dioxide    Date Value Ref Range Status   05/06/2019 25 21 - 31 mmol/L Final     Anion Gap   Date Value Ref Range Status   05/06/2019 9 3 - 14 mmol/L Final     Glucose   Date Value Ref Range Status   05/06/2019 89 70 - 105 mg/dL Final     Urea Nitrogen   Date Value Ref Range Status   05/06/2019 18 7 - 25 mg/dL Final     Creatinine   Date Value Ref Range Status   05/06/2019 0.85 0.60 - 1.20 mg/dL Final     GFR Estimate   Date Value Ref Range Status   05/06/2019 64 >60 mL/min/[1.73_m2] Final     Calcium   Date Value Ref Range Status   05/06/2019 9.7 8.6 - 10.3 mg/dL Final         Assessment/Plan:  (G30.1,  F02.81) Late onset Alzheimer's disease with behavioral disturbance  (primary encounter diagnosis)  Comment: progressive  Plan: continue to provide comfort measures, pain control and anticipate needs.     (Z87.81) S/p left hip fracture  Comment: healing  Plan: Continue ortho follow up May 29, tylenol for pain  Check post op labs CBC, CMP    (E03.9) Hypothyroidism, unspecified type  Comment: stable  Plan: cont synthroid, last TSH ok in Jan 2019.         A total of 30 minutes was spent with this patient, of which more than 50% was spent in counseling and/or coordination of care.     DEBI Ellison, CNP ....................  5/14/2019   10:04 AM

## 2019-05-16 NOTE — TELEPHONE ENCOUNTER
Not sure if the same, request is for SM Lax Plus Stool Soft (Senokot S)  Senna-Lax 8.6 mg is on med list.  Also, requests Tramaol 50 mg Take one tabl every 6 hours prn pain (not on med list)

## 2019-05-17 PROBLEM — R32 URINARY INCONTINENCE: Status: ACTIVE | Noted: 2019-01-01

## 2019-05-20 NOTE — TELEPHONE ENCOUNTER
Refill Request for: vitamin D3 (CHOLECALCIFEROL) 1000 units (25 mcg) tablet   Received From: [id number]  Last Written Prescription Date: 05/14/19 for 90 tablets/capsules and 3 refills  LOV: 05/14/19 with PCP for Nursing Home Visit   Next Appointment: No upcoming office visit on file during initial refill review with PCP  Protocol: Vitamin Supplements (Adult) Protocol Failed - 5/20 8:41 AM   Recent (12 mo) or future (30 days) visit within the authorizing provider's specialty     Refill request denied; too soon, last written prescription on 05/14/19 should provide adequate medication supply.       Makayla Price RN on 5/20/2019 at 8:45 AM

## 2019-06-04 NOTE — TELEPHONE ENCOUNTER
Patient was admitted to hospice and [facility] was calling to get okay to manage care for patient.    Mishel Sr LPN on 6/4/2019 at 4:25 PM

## 2019-06-19 NOTE — PROGRESS NOTES
Dr. Garcia reviewed and completed the following home care or hospice form(s) for Katt GONZALEZ Valdemar  on June 19, 2019 .  This covers the certification period effective 3/7/2019-5/5/2019.    [facility]

## 2020-05-29 NOTE — TELEPHONE ENCOUNTER
Also requesting     Tramadol 50 mg 1 tab Q 6 hrs PRN for pain not on current medication list  Aspirin 81 mg chewable tablet BID for HTN not on current med list  Vitamin D# 1000 units 1 tab Q Morning for Hip FX not on current med list  Calcium Antacid 500 mg Chewable chew and swallow 1 tab by mouth TID     Called [facility] [employer] to anusha ECHEVARRIA for Advair and was told pt's coverage lapsed, however when I spoke to pt he explained he still works for the same company and HR will have to send a letter to insurer stating the coverage should stay intact. Lapse date is 04/30/20 and [employer] rules should prevail.

## 2020-11-13 NOTE — TELEPHONE ENCOUNTER
Patient Information     Patient Name MRN Katt Pittman [id number] Female 1934      Telephone Encounter by Colin Garcia MD at 3/7/2017  4:50 PM     Author:  Colin Garcia MD Service:  (none) Author Type:  Physician     Filed:  3/7/2017  4:51 PM Encounter Date:  3/7/2017 Status:  Signed     :  Colin Garcia MD (Physician)            Current recommendations for first time DVT is 3 months of anticoagulation which she completed. It is okay for her to stop Xarelto at this time.         clear

## (undated) RX ORDER — SODIUM CHLORIDE 9 MG/ML
INJECTION, SOLUTION INTRAVENOUS
Status: DISPENSED
Start: 2019-01-01

## (undated) RX ORDER — GINSENG 100 MG
CAPSULE ORAL
Status: DISPENSED
Start: 2019-01-01